# Patient Record
Sex: FEMALE | Race: BLACK OR AFRICAN AMERICAN | NOT HISPANIC OR LATINO
[De-identification: names, ages, dates, MRNs, and addresses within clinical notes are randomized per-mention and may not be internally consistent; named-entity substitution may affect disease eponyms.]

---

## 2023-03-24 PROBLEM — Z00.00 ENCOUNTER FOR PREVENTIVE HEALTH EXAMINATION: Status: ACTIVE | Noted: 2023-03-24

## 2023-05-30 ENCOUNTER — NON-APPOINTMENT (OUTPATIENT)
Age: 63
End: 2023-05-30

## 2023-05-30 ENCOUNTER — APPOINTMENT (OUTPATIENT)
Dept: ENDOCRINOLOGY | Facility: CLINIC | Age: 63
End: 2023-05-30
Payer: COMMERCIAL

## 2023-05-30 VITALS
SYSTOLIC BLOOD PRESSURE: 181 MMHG | HEIGHT: 67 IN | HEART RATE: 70 BPM | DIASTOLIC BLOOD PRESSURE: 106 MMHG | WEIGHT: 275 LBS | BODY MASS INDEX: 43.16 KG/M2

## 2023-05-30 PROCEDURE — 99205 OFFICE O/P NEW HI 60 MIN: CPT

## 2023-05-31 NOTE — REVIEW OF SYSTEMS
[Negative] : Heme/Lymph [Polyuria] : polyuria [Nocturia] : nocturia [As Noted in HPI] : as noted in HPI [Tremors] : tremors [Headaches] : no headaches

## 2023-05-31 NOTE — CONSULT LETTER
[Dear  ___] : Dear  [unfilled], [Consult Letter:] : I had the pleasure of evaluating your patient, [unfilled]. [Sincerely,] : Sincerely, [FreeTextEntry3] : Caesar Quintanilla\par

## 2023-05-31 NOTE — ADDENDUM
[FreeTextEntry1] : I, Erika Reed, acted solely as a scribe for Dr. Caesar Quintanilla on this date 05/30/2023

## 2023-05-31 NOTE — HISTORY OF PRESENT ILLNESS
[FreeTextEntry1] : 62 year old F pt, with Hx of pituitary lesion found incidentally in , referred by Dr. Dr. Gema Walsh, presents today to establish endocrine care. \par Other PMHx: HTN, Anxiety, Glaucoma, Bipolar/Schizophrenia \par No surgical history, No thyroid conditions, no liver disease, no bone fractures.\par Denies antipsychotic medication use in  - \par No COVID Infections\par FHx: Mother: Breast CA \par No FHx of: Thyroid Issues, pituitary issues \par SHx: Stopped smoking 2 yrs ago, Drinks wine occasionally\par Lifestyle: Wakes up 5:30 am, sleeps 9 pm. She drinks a lot of water:  \par PCP: Dr. Gema Walsh (Phone # [phone], Fax # [phone])  \par NKDA  \par Sees psychiatrist since . (Drug induced Psychotic break: PCP ~ 15 yrs ago) \par Two pregnancies that were two : age 24 and age 29. \par Flu and COVID Vaccines \par \par 2023 \par Pt is here for today for endocrine visit. No previous medical record seen. Referral indicates pt is here due to Pituitary lesion and Substance-induced psychotic disorder.  \par Pt states pituitary lesion was found incidentally in [facility] when she was in the psych amezquita due to a psychotic break in  from a " bad batch of gummies "  as per pt. Blood tests and an X-Ray of her brain was done. She notes an additional psychotics break on . During these episodes, she reports she was hearing things and was not functioning well. \par Endorses polyuria for ~ 5 yrs, which worsened 6 months ago, and nocturia: 2x after midnight. Prior, she had nocturia once every hr. Pt reports she was rx Oxybutynin by her Emergency Specialist, and says her polyuria has improved. She has seen an urologist, who said pt should continue to take Oxybutynin. In addition, pt endorses feeling off balance, occasional R hand tremors that occurs anytime since . \par Denies HAs.  \par \par Current Medications: Carvedilol, Amlodipine 5 mg, Olanzapine (Rx ~ ), Oxybutynin.

## 2023-05-31 NOTE — PHYSICAL EXAM
[Alert] : alert [Well Nourished] : well nourished [No Acute Distress] : no acute distress [Well Developed] : well developed [Normal Sclera/Conjunctiva] : normal sclera/conjunctiva [EOMI] : extra ocular movement intact [No Proptosis] : no proptosis [Normal Oropharynx] : the oropharynx was normal [Thyroid Not Enlarged] : the thyroid was not enlarged [No Thyroid Nodules] : no palpable thyroid nodules [No Respiratory Distress] : no respiratory distress [No Accessory Muscle Use] : no accessory muscle use [Clear to Auscultation] : lungs were clear to auscultation bilaterally [Normal S1, S2] : normal S1 and S2 [Normal Rate] : heart rate was normal [Regular Rhythm] : with a regular rhythm [No Edema] : no peripheral edema [Pedal Pulses Normal] : the pedal pulses are present [Normal Bowel Sounds] : normal bowel sounds [Not Tender] : non-tender [Not Distended] : not distended [Soft] : abdomen soft [Normal Anterior Cervical Nodes] : no anterior cervical lymphadenopathy [Normal Posterior Cervical Nodes] : no posterior cervical lymphadenopathy [No Spinal Tenderness] : no spinal tenderness [Spine Straight] : spine straight [No Stigmata of Cushings Syndrome] : no stigmata of Cushings Syndrome [Normal Gait] : normal gait [Normal Strength/Tone] : muscle strength and tone were normal [No Rash] : no rash [Normal Reflexes] : deep tendon reflexes were 2+ and symmetric [No Tremors] : no tremors [Oriented x3] : oriented to person, place, and time [Acanthosis Nigricans] : no acanthosis nigricans [de-identified] : No buffalo hump

## 2023-05-31 NOTE — END OF VISIT
[FreeTextEntry3] : All medical record entries made by the Scribe were at my, Dr. Caesar Quintanilla, direction and personally dictated by me on 05/30/2023. I have reviewed the chart and agree that the record accurately reflects my personal performance of the history, physical exam, assessment and plan. I have also personally, directed, reviewed and agreed with the chart  [Time Spent: ___ minutes] : I have spent [unfilled] minutes of time on the encounter.

## 2023-06-28 ENCOUNTER — NON-APPOINTMENT (OUTPATIENT)
Age: 63
End: 2023-06-28

## 2023-06-28 ENCOUNTER — APPOINTMENT (OUTPATIENT)
Dept: ENDOCRINOLOGY | Facility: CLINIC | Age: 63
End: 2023-06-28
Payer: COMMERCIAL

## 2023-06-28 VITALS
WEIGHT: 279 LBS | SYSTOLIC BLOOD PRESSURE: 194 MMHG | HEART RATE: 79 BPM | DIASTOLIC BLOOD PRESSURE: 113 MMHG | BODY MASS INDEX: 43.7 KG/M2

## 2023-06-28 PROCEDURE — 99214 OFFICE O/P EST MOD 30 MIN: CPT

## 2023-06-29 NOTE — END OF VISIT
[FreeTextEntry3] : All medical record entries made by the Scribe were at my, Dr. Caesar Quintanilla, direction and personally dictated by me on 06/28/2023. I have reviewed the chart and agree that the record accurately reflects my personal performance of the history, physical exam, assessment and plan. I have also personally directed, reviewed and agreed with the chart.  [Time Spent: ___ minutes] : I have spent [unfilled] minutes of time on the encounter.

## 2023-06-29 NOTE — PHYSICAL EXAM
[Alert] : alert [Well Nourished] : well nourished [No Acute Distress] : no acute distress [Well Developed] : well developed [Normal Sclera/Conjunctiva] : normal sclera/conjunctiva [EOMI] : extra ocular movement intact [No Proptosis] : no proptosis [Normal Oropharynx] : the oropharynx was normal [Thyroid Not Enlarged] : the thyroid was not enlarged [No Thyroid Nodules] : no palpable thyroid nodules [No Respiratory Distress] : no respiratory distress [No Accessory Muscle Use] : no accessory muscle use [Clear to Auscultation] : lungs were clear to auscultation bilaterally [Normal S1, S2] : normal S1 and S2 [Normal Rate] : heart rate was normal [Regular Rhythm] : with a regular rhythm [No Edema] : no peripheral edema [Pedal Pulses Normal] : the pedal pulses are present [Normal Bowel Sounds] : normal bowel sounds [Not Tender] : non-tender [Not Distended] : not distended [Soft] : abdomen soft [Normal Anterior Cervical Nodes] : no anterior cervical lymphadenopathy [No Spinal Tenderness] : no spinal tenderness [Spine Straight] : spine straight [No Stigmata of Cushings Syndrome] : no stigmata of Cushings Syndrome [Normal Gait] : normal gait [Normal Strength/Tone] : muscle strength and tone were normal [No Rash] : no rash [Normal Reflexes] : deep tendon reflexes were 2+ and symmetric [No Tremors] : no tremors [Oriented x3] : oriented to person, place, and time [Acanthosis Nigricans] : no acanthosis nigricans [de-identified] : No buffalo hump

## 2023-06-29 NOTE — HISTORY OF PRESENT ILLNESS
[FreeTextEntry1] : 62 year old F pt, with Hx of pituitary lesion found incidentally in , referred by Dr. Gema Walsh, presents today to establish endocrine care. \par Other PMHx: HTN, Anxiety, Glaucoma, Bipolar/Schizophrenia \par No surgical history, No thyroid conditions, no liver disease, no bone fractures.\par Denies antipsychotic medication use in  - \par No COVID Infections\par FHx: Mother: Breast CA \par No FHx of: Thyroid Issues, pituitary issues \par SHx: Stopped smoking 2 yrs ago, Drinks wine occasionally\par Lifestyle: Wakes up 5:30 am, sleeps 9 pm. She drinks a lot of water:  \par PCP: Dr. Gema Walsh (Phone # [phone], Fax # [phone])  \par NKDA  \par Sees psychiatrist since . (Drug induced Psychotic break: PCP ~ 15 yrs ago) \par Two pregnancies that were two : age 24 and age 29. \par Flu and COVID Vaccines \par \par 2023 \par Pt is here for today for endocrine visit. No previous medical record seen. Referral indicates pt is here due to Pituitary lesion and Substance-induced psychotic disorder.  \par Pt states pituitary lesion was found incidentally in [facility] when she was in the psych amezquita due to a psychotic break in  from a " bad batch of gummies "  as per pt. Blood tests and an X-Ray of her brain was done. She notes an additional psychotics break on . During these episodes, she reports she was hearing things and was not functioning well. \par Endorses polyuria for ~ 5 yrs, which worsened 6 months ago, and nocturia: 2x after midnight. Prior, she had nocturia once every hr. Pt reports she was rx Oxybutynin by her Emergency Specialist, and says her polyuria has improved. She has seen an urologist, who said pt should continue to take Oxybutynin. In addition, pt endorses feeling off balance, occasional R hand tremors that occurs anytime since . \par Denies HAs.  \par \par 2023\par CC: "I'm feeling fine"\par Physical complaints other than joints pain.\par She was prescribed oxybutynin however she continues with frequent urination, no fever, nor dysuria\par \par \par [Medications verified as per pt on 2023) \par Current Medications: Carvedilol, Amlodipine 5 mg, Olanzapine (Rx ~ ), Oxybutynin.

## 2023-06-29 NOTE — REVIEW OF SYSTEMS
[Polyuria] : polyuria [Nocturia] : nocturia [As Noted in HPI] : as noted in HPI [Tremors] : tremors [Negative] : Heme/Lymph [Headaches] : no headaches

## 2023-06-29 NOTE — ADDENDUM
[FreeTextEntry1] : I, Sun Bal act soley as a scribe for Dr. Caesar Quintanilla on this date. 06/28/2023

## 2023-06-29 NOTE — DATA REVIEWED
[FreeTextEntry1] : Scanned Labs:\par - 06/03/23: LDL-c 162, S.creat 0.81, cortisol 23.3, Free T4 1.1, TSH 1.35, Prolactin 20.8, ACTH 24 Respiratory

## 2023-07-06 ENCOUNTER — NON-APPOINTMENT (OUTPATIENT)
Age: 63
End: 2023-07-06

## 2023-10-12 ENCOUNTER — APPOINTMENT (OUTPATIENT)
Dept: NEUROLOGY | Facility: CLINIC | Age: 63
End: 2023-10-12
Payer: COMMERCIAL

## 2023-10-12 VITALS
HEART RATE: 66 BPM | WEIGHT: 269 LBS | BODY MASS INDEX: 42.22 KG/M2 | DIASTOLIC BLOOD PRESSURE: 102 MMHG | OXYGEN SATURATION: 99 % | HEIGHT: 67 IN | TEMPERATURE: 98.1 F | SYSTOLIC BLOOD PRESSURE: 157 MMHG

## 2023-10-12 VITALS — DIASTOLIC BLOOD PRESSURE: 94 MMHG | HEART RATE: 65 BPM | SYSTOLIC BLOOD PRESSURE: 134 MMHG | OXYGEN SATURATION: 98 %

## 2023-10-12 DIAGNOSIS — Z80.3 FAMILY HISTORY OF MALIGNANT NEOPLASM OF BREAST: ICD-10-CM

## 2023-10-12 DIAGNOSIS — Z83.3 FAMILY HISTORY OF DIABETES MELLITUS: ICD-10-CM

## 2023-10-12 PROCEDURE — 99203 OFFICE O/P NEW LOW 30 MIN: CPT

## 2023-10-13 PROBLEM — Z83.3 FAMILY HISTORY OF DIABETES MELLITUS: Status: ACTIVE | Noted: 2023-10-12

## 2023-10-13 PROBLEM — Z80.3 FAMILY HISTORY OF MALIGNANT NEOPLASM OF BREAST: Status: ACTIVE | Noted: 2023-10-12

## 2023-11-01 ENCOUNTER — RESULT REVIEW (OUTPATIENT)
Age: 63
End: 2023-11-01

## 2023-11-13 ENCOUNTER — OUTPATIENT (OUTPATIENT)
Dept: OUTPATIENT SERVICES | Facility: HOSPITAL | Age: 63
LOS: 1 days | End: 2023-11-13
Payer: COMMERCIAL

## 2023-11-13 ENCOUNTER — APPOINTMENT (OUTPATIENT)
Dept: MRI IMAGING | Facility: CLINIC | Age: 63
End: 2023-11-13

## 2023-11-13 ENCOUNTER — RESULT REVIEW (OUTPATIENT)
Age: 63
End: 2023-11-13

## 2023-11-13 PROCEDURE — 70553 MRI BRAIN STEM W/O & W/DYE: CPT | Mod: 26

## 2023-11-15 ENCOUNTER — APPOINTMENT (OUTPATIENT)
Dept: NEUROSURGERY | Facility: CLINIC | Age: 63
End: 2023-11-15
Payer: COMMERCIAL

## 2023-11-15 VITALS
RESPIRATION RATE: 18 BRPM | DIASTOLIC BLOOD PRESSURE: 97 MMHG | HEIGHT: 67 IN | SYSTOLIC BLOOD PRESSURE: 154 MMHG | BODY MASS INDEX: 42.22 KG/M2 | OXYGEN SATURATION: 98 % | WEIGHT: 269 LBS | HEART RATE: 71 BPM

## 2023-11-15 PROCEDURE — 99204 OFFICE O/P NEW MOD 45 MIN: CPT

## 2024-02-14 ENCOUNTER — NON-APPOINTMENT (OUTPATIENT)
Age: 64
End: 2024-02-14

## 2024-03-15 ENCOUNTER — OUTPATIENT (OUTPATIENT)
Dept: OUTPATIENT SERVICES | Facility: HOSPITAL | Age: 64
LOS: 1 days | End: 2024-03-15

## 2024-03-15 ENCOUNTER — APPOINTMENT (OUTPATIENT)
Dept: MRI IMAGING | Facility: CLINIC | Age: 64
End: 2024-03-15
Payer: COMMERCIAL

## 2024-03-15 PROCEDURE — 70553 MRI BRAIN STEM W/O & W/DYE: CPT | Mod: 26

## 2024-03-27 ENCOUNTER — APPOINTMENT (OUTPATIENT)
Dept: NEUROSURGERY | Facility: CLINIC | Age: 64
End: 2024-03-27
Payer: COMMERCIAL

## 2024-03-27 VITALS
DIASTOLIC BLOOD PRESSURE: 117 MMHG | OXYGEN SATURATION: 98 % | WEIGHT: 277 LBS | RESPIRATION RATE: 18 BRPM | HEART RATE: 58 BPM | SYSTOLIC BLOOD PRESSURE: 163 MMHG | HEIGHT: 67 IN | BODY MASS INDEX: 43.47 KG/M2

## 2024-03-27 PROCEDURE — 99215 OFFICE O/P EST HI 40 MIN: CPT

## 2024-03-27 NOTE — DATA REVIEWED
[de-identified] : EXAM: [id number] - MR SELLA ONLY WAW IC  - ORDERED BY: TODD BANK   PROCEDURE DATE:  03/15/2024    INTERPRETATION:  Clinical indication: Pituitary lesion.  MRI of the sella turcica was performed using coronal and sagittal T1-weighted sequence. Coronal T2-weighted sequence was performed as well. Axial T2 FLAIR and diffusion was performed through the brain. The patient was injected with approximately 12 cc of gadavist IV with 3 cc contrast discarded. Coronal and sagittal T1 was performed through the sella turcica. Dynamic coronal T1 was performed as well. Axial coronal sagittal T1 sequence was performed through the brain.  This exam is compared with prior MRI sella turcica performed on November 13, 2023.  Large lesion involving the sella/suprasellar region is again identified which does appear to abut the dorsal aspect of the superior clivus. The lesion measures approximately 1.9 x 2.2 x 2.1 cm and previously measured approximately 1.9 x 2.0 x 1.9 cm. This lesion again appears to abut the optic chiasm and the adjacent optic chiasm appears unremarkable  Both adjacent internal carotid arteries demonstrate normal flow voids.  Evaluation of brain parenchyma demonstrates no acute hemorrhage mass mass effect. No abnormal areas of enhancement is seen.  Evaluation of the diffusion weighted sequence demonstrates no abnormal areas of restricted diffusion to suggest acute infarct.  Impression: Slight increase in size of the previously noted sella/suprasellar lesion as described above.  --- End of Report ---

## 2024-03-27 NOTE — PHYSICAL EXAM
[General Appearance - Alert] : alert [General Appearance - In No Acute Distress] : in no acute distress [General Appearance - Well-Appearing] : healthy appearing [Oriented To Time, Place, And Person] : oriented to person, place, and time [Impaired Insight] : insight and judgment were intact [Affect] : the affect was normal [Memory Recent] : recent memory was not impaired [Sensation Tactile Decrease] : light touch was intact [Sclera] : the sclera and conjunctiva were normal [PERRL With Normal Accommodation] : pupils were equal in size, round, reactive to light, with normal accommodation [Extraocular Movements] : extraocular movements were intact [Neck Appearance] : the appearance of the neck was normal [] : no respiratory distress [Respiration, Rhythm And Depth] : normal respiratory rhythm and effort [Abnormal Walk] : normal gait [Skin Color & Pigmentation] : normal skin color and pigmentation [FreeTextEntry5] : CN II-XII grossly intact

## 2024-03-27 NOTE — HISTORY OF PRESENT ILLNESS
[FreeTextEntry1] : 62 y/o female with PMHx of HTN, anxiety, bipolar/schizophrenia, tremors, glaucoma, incidentally found to have pituitary mass while admitted at [facility] for psychosis Feb 2022.  Following pituitary lesion dx, patient has been following with endocrinologist Dr. Caesar Thakkar. Endo labs notable for cortisol 23.3.  Recently saw neurologist Dr. Hightower for evaluation of pituitary lesion who ordered new MRI for further evaluation which she completed 11/13/23 which showed 2 x 2 x 2 cm right pituitary mass with erosion of the dorsal clivus and extension into the suprasellar cistern.  11/15/23 pt presented for evaluation as referral from Dr. Hightower. Asymptomatic. Recommended she see neuro-opth Naif Delgado for visual field testing, continue f/u with endocrine, repeat MRI 3 months.   Returns TODAY for f/u and MRI review.  Denies vision changes, headaches, dizziness, other new/worsening focal neuro deficits.  Endorses has not followed up with endo.   Neurologist: Claudia Hightower Endocrinologist: Caesar Thakkar

## 2024-03-27 NOTE — ASSESSMENT
[FreeTextEntry1] : 63F with asymptomatic incidental pituitary adenoma. Labs WNL. Denies visual issues. No chiasmatic compression. Repeat MRI at 4 months shows increase in size. Given the growth of the tumor, I recommend the removal of the same. The surgery would be conducted with the help of an ENT doctor and would involve going through the nose. I have asked the patient to see a neuro-ophthalmologist for further evaluation, and am planning for surgical intervention on April 22nd, pending clearance. In the meantime, primary care and cardiac clearance will be needed due to the patient's elevated blood pressure.   After detailed imaging review and patient examination, Dr. D'Amico discussed surgical intervention with patient.   Potential surgical risks vs benefits and alternatives discussed with time allotted for questions and answers given. Patient verbalizes understanding and wishes to proceed with surgical intervention.  Date of surgery: 4/22/24  Pre-op surgical packet and medical clearance packets reviewed and given to patient. She will need medical and cardiac clearances.   Should see neuro-opth Dr. Naif Delgado for pre-op visual field testing.  Should also see ENT Dr. Foreman for pre-op eval.  F/u with endocrinologist Dr. Quintanilla.  Will need CT max/face the morning of surgery.   Patient verbalizes understanding of today's discussion and next steps in treatment plan.   Pre- op RAPT score 11/12. Predicted dispo: home.  CHG wipes and education given to patient by MA.    A total of 45 minutes was spent reviewing the labs, imaging and physical examination of the patient. We discussed the diagnosis, and the plan. The patient's questions were answered. The patient demonstrated an excellent understanding of the plan.

## 2024-04-05 ENCOUNTER — OUTPATIENT (OUTPATIENT)
Dept: OUTPATIENT SERVICES | Facility: HOSPITAL | Age: 64
LOS: 1 days | End: 2024-04-05
Payer: COMMERCIAL

## 2024-04-05 ENCOUNTER — APPOINTMENT (OUTPATIENT)
Dept: INTERNAL MEDICINE | Facility: CLINIC | Age: 64
End: 2024-04-05
Payer: COMMERCIAL

## 2024-04-05 ENCOUNTER — NON-APPOINTMENT (OUTPATIENT)
Age: 64
End: 2024-04-05

## 2024-04-05 VITALS
DIASTOLIC BLOOD PRESSURE: 102 MMHG | RESPIRATION RATE: 17 BRPM | TEMPERATURE: 97.2 F | BODY MASS INDEX: 43.47 KG/M2 | HEART RATE: 77 BPM | HEIGHT: 67 IN | SYSTOLIC BLOOD PRESSURE: 163 MMHG | WEIGHT: 277 LBS | OXYGEN SATURATION: 98 %

## 2024-04-05 DIAGNOSIS — E66.01 MORBID (SEVERE) OBESITY DUE TO EXCESS CALORIES: ICD-10-CM

## 2024-04-05 DIAGNOSIS — R73.03 PREDIABETES.: ICD-10-CM

## 2024-04-05 DIAGNOSIS — I10 ESSENTIAL (PRIMARY) HYPERTENSION: ICD-10-CM

## 2024-04-05 DIAGNOSIS — F25.0 SCHIZOAFFECTIVE DISORDER, BIPOLAR TYPE: ICD-10-CM

## 2024-04-05 DIAGNOSIS — Z01.818 ENCOUNTER FOR OTHER PREPROCEDURAL EXAMINATION: ICD-10-CM

## 2024-04-05 DIAGNOSIS — F06.1 SCHIZOAFFECTIVE DISORDER, BIPOLAR TYPE: ICD-10-CM

## 2024-04-05 PROCEDURE — 71046 X-RAY EXAM CHEST 2 VIEWS: CPT

## 2024-04-05 PROCEDURE — 93000 ELECTROCARDIOGRAM COMPLETE: CPT

## 2024-04-05 PROCEDURE — 36415 COLL VENOUS BLD VENIPUNCTURE: CPT

## 2024-04-05 PROCEDURE — 99204 OFFICE O/P NEW MOD 45 MIN: CPT

## 2024-04-05 PROCEDURE — 71046 X-RAY EXAM CHEST 2 VIEWS: CPT | Mod: 26

## 2024-04-05 PROCEDURE — 70486 CT MAXILLOFACIAL W/O DYE: CPT

## 2024-04-05 PROCEDURE — 70486 CT MAXILLOFACIAL W/O DYE: CPT | Mod: 26

## 2024-04-05 RX ORDER — OLANZAPINE 10 MG/1
10 TABLET, FILM COATED ORAL
Refills: 0 | Status: ACTIVE | COMMUNITY

## 2024-04-05 RX ORDER — ACETAMINOPHEN 500 MG
500 TABLET ORAL
Refills: 0 | Status: ACTIVE | COMMUNITY

## 2024-04-05 RX ORDER — OXYBUTYNIN CHLORIDE 15 MG/1
15 TABLET, EXTENDED RELEASE ORAL
Refills: 0 | Status: ACTIVE | COMMUNITY

## 2024-04-05 RX ORDER — AMLODIPINE BESYLATE 5 MG/1
5 TABLET ORAL
Refills: 0 | Status: ACTIVE | COMMUNITY

## 2024-04-05 RX ORDER — MULTIVITAMIN
TABLET ORAL
Refills: 0 | Status: ACTIVE | COMMUNITY

## 2024-04-05 RX ORDER — LATANOPROST/PF 0.005 %
0.01 DROPS OPHTHALMIC (EYE)
Refills: 0 | Status: ACTIVE | COMMUNITY

## 2024-04-05 RX ORDER — BENZTROPINE MESYLATE 1 MG/1
1 TABLET ORAL
Refills: 0 | Status: ACTIVE | COMMUNITY

## 2024-04-07 ENCOUNTER — TRANSCRIPTION ENCOUNTER (OUTPATIENT)
Age: 64
End: 2024-04-07

## 2024-04-08 LAB
ALBUMIN SERPL ELPH-MCNC: 4.5 G/DL
ALP BLD-CCNC: 93 U/L
ALT SERPL-CCNC: 25 U/L
ANION GAP SERPL CALC-SCNC: 12 MMOL/L
APTT BLD: 30.2 SEC
AST SERPL-CCNC: 20 U/L
BASOPHILS # BLD AUTO: 0.03 K/UL
BASOPHILS NFR BLD AUTO: 0.5 %
BILIRUB SERPL-MCNC: 0.4 MG/DL
BUN SERPL-MCNC: 13 MG/DL
CALCIUM SERPL-MCNC: 10.1 MG/DL
CHLORIDE SERPL-SCNC: 106 MMOL/L
CO2 SERPL-SCNC: 24 MMOL/L
CREAT SERPL-MCNC: 0.89 MG/DL
EGFR: 73 ML/MIN/1.73M2
EOSINOPHIL # BLD AUTO: 0.04 K/UL
EOSINOPHIL NFR BLD AUTO: 0.7 %
ESTIMATED AVERAGE GLUCOSE: 114 MG/DL
GLUCOSE SERPL-MCNC: 104 MG/DL
HBA1C MFR BLD HPLC: 5.6 %
HCG SERPL-MCNC: 2 MIU/ML
HCT VFR BLD CALC: 42.5 %
HGB BLD-MCNC: 14 G/DL
IMM GRANULOCYTES NFR BLD AUTO: 0.2 %
INR PPP: 1.06 RATIO
LYMPHOCYTES # BLD AUTO: 1.43 K/UL
LYMPHOCYTES NFR BLD AUTO: 23.8 %
MAN DIFF?: NORMAL
MCHC RBC-ENTMCNC: 29.9 PG
MCHC RBC-ENTMCNC: 32.9 GM/DL
MCV RBC AUTO: 90.6 FL
MONOCYTES # BLD AUTO: 0.43 K/UL
MONOCYTES NFR BLD AUTO: 7.1 %
NEUTROPHILS # BLD AUTO: 4.08 K/UL
NEUTROPHILS NFR BLD AUTO: 67.7 %
PLATELET # BLD AUTO: 288 K/UL
POTASSIUM SERPL-SCNC: 4.2 MMOL/L
PROT SERPL-MCNC: 7 G/DL
PT BLD: 12.1 SEC
RBC # BLD: 4.69 M/UL
RBC # FLD: 16.1 %
SODIUM SERPL-SCNC: 142 MMOL/L
WBC # FLD AUTO: 6.02 K/UL

## 2024-04-09 ENCOUNTER — APPOINTMENT (OUTPATIENT)
Dept: OTOLARYNGOLOGY | Facility: CLINIC | Age: 64
End: 2024-04-09
Payer: COMMERCIAL

## 2024-04-09 PROCEDURE — 99204 OFFICE O/P NEW MOD 45 MIN: CPT | Mod: 25

## 2024-04-09 PROCEDURE — 31231 NASAL ENDOSCOPY DX: CPT

## 2024-04-10 NOTE — HISTORY OF PRESENT ILLNESS
[de-identified] : CC: pituiatary lesion    HISTORY OF PRESENT ILLNESS: Aniyah Coleman is a 62 y/o female with  PMH of HTN, anxiety, bipolar/schizophrenia, tremors, glaucoma who presents today with pituitary mass that was incidentally found while admitted at [facility] for psychosis in Feb 2022 she was referred by neurosurgeon Dr. D'Amico. followed by endocrinologist Dr. Caesar Thakkar she has a good understanding of her diagnosis at baseline she does not have any nasal congestion, obstruction or rhinorrhea  REVIEW OF SYSTEMS: General ROS: negative for - chills, fatigue or fever Psychological ROS: negative for - anxiety or depression Ophthalmic ROS: negative for - blurry vision, decreased vision or double vision ENT ROS: negative except as noted from HPI Allergy and Immunology ROS: negative except as noted from HPI Hematological and Lymphatic ROS: negative for - bleeding problems Endocrine ROS: negative for - malaise/lethargy Respiratory ROS: negative for - stridor Cardiovascular ROS: negative for - chest pain Gastrointestinal ROS: negative for - appetite loss or nausea/vomiting Genitourinary ROS: negative for - incontinence Musculoskeletal ROS: negative for - gait disturbance Neurological ROS: negative for - behavioral changes Dermatological ROS: negative for - nail changes   Physical Exam:   GENERAL APPEARANCE: Well-developed and No Acute Distress. COMMUNICATION: Able to Communicate. Strong Voice.   HEAD AND FACE Eyes: Testing of ocular motility including primary gaze alignment normal. Inspection and Appearance: No evidence of lesions or masses Palpation: Palpation of the face reveals no sinus tenderness Salivary Glands: Symmetric without masses Facial Strength: Symmetric without evidence of facial paralysis   EAR, NOSE, MOUTH, and THROAT: Ear Canals and Tympanic Membranes, Bilateral: No evidence of inflammation or lesions. Thresholds: Clinical speech reception thresholds normal. External, Nose and Auricle: No lesions or masses. Nasal, Mucosa, Septum, and Turbinates: See endoscopy.   NECK: Evaluation: No evidence of masses or crepitus. The neck is symmetric and the trachea is in the midline position. Thyroid: No evidence of enlargement, tenderness or mass. Neck Lymph Nodes: WNL. Respiratory: Inspection of the chest including symmetry, expansion and/or assessment of respiratory effort normal. Cardiovascular: Evaluation of peripheral vascular system by observation and palpation of capillary refill, normal. Neurological/Psychiatric: Alert, Oriented, Mood, and Affect Normal.   PROCEDURE: Nasal endoscopy (35083)   SURGEON: Anthony oFreman MD   Prior to the procedure, I had a discussion with the patient regarding the risks, benefits, and alternatives of the procedure and a verbal consent was obtained.   After obtaining adequate decongestion of the nasal mucosa with topical Epinephrine and adequate anesthesia with topical Lidocaine nasal spray, the nasal endoscope was used to examine the nasal passages and paranasal sinuses. The endoscope was passed along the floor of the nose bilaterally to evaluate the inferior meatus, floor of the nose, inferior turbinate, and nasopharynx. The scope was then passed superiorly to evaluate the area of the sphenoethmoidal recess, superior turbinate and superior meatus. As the scope was withdrawn anteriorly, the middle turbinate and middle meatus were carefully inspected. The endoscope was withdrawn and the patient tolerated the procedure well. No complications were encountered.   INSTRUMENTS: rigid zero   EXAM FINDINGS: right septal deformity, no significant sinonasal inflammation   IMPRESSION: Ms. Coleman is a pleasant 63 year old lady with 2.2cm pituitary macroadenoma   PLAN: -r/b/a discussed, I explained my portion of the case with a transphenoidal endonasal approach and possible reconstruction. patient expressed understanding and wishes to proceed -plan for OR with Dr. D'Amico, navigation scan has been performed   Anthony Foreman MD [facility] Rhinology and Skull Base Surgery Department of Otolaryngology- Head and Neck Surgery [facility]

## 2024-04-12 ENCOUNTER — APPOINTMENT (OUTPATIENT)
Dept: HEART AND VASCULAR | Facility: CLINIC | Age: 64
End: 2024-04-12
Payer: COMMERCIAL

## 2024-04-12 VITALS
BODY MASS INDEX: 38.92 KG/M2 | OXYGEN SATURATION: 99 % | SYSTOLIC BLOOD PRESSURE: 171 MMHG | HEART RATE: 66 BPM | HEIGHT: 67 IN | WEIGHT: 248 LBS | DIASTOLIC BLOOD PRESSURE: 110 MMHG | TEMPERATURE: 98.1 F

## 2024-04-12 DIAGNOSIS — Z01.810 ENCOUNTER FOR PREPROCEDURAL CARDIOVASCULAR EXAMINATION: ICD-10-CM

## 2024-04-12 DIAGNOSIS — I10 ESSENTIAL (PRIMARY) HYPERTENSION: ICD-10-CM

## 2024-04-12 PROCEDURE — G2211 COMPLEX E/M VISIT ADD ON: CPT

## 2024-04-12 PROCEDURE — 99204 OFFICE O/P NEW MOD 45 MIN: CPT

## 2024-04-12 RX ORDER — CARVEDILOL 25 MG/1
25 TABLET, FILM COATED ORAL
Qty: 60 | Refills: 2 | Status: ACTIVE | COMMUNITY
Start: 1900-01-01 | End: 1900-01-01

## 2024-04-12 NOTE — ASSESSMENT
[FreeTextEntry1] : Risks and benefits regarding plan was discussed with the patient who voiced understanding and agreement to proceed.

## 2024-04-12 NOTE — DISCUSSION/SUMMARY
[Procedure High Risk] : the procedure risk is high [Patient Low Risk] : the patient is a low surgical risk [Optimized for Surgery] : the patient is optimized for surgery [As per surgery] : as per surgery [Continue] : Continue medications as currently directed [FreeTextEntry1] : HTN mgt as above

## 2024-04-12 NOTE — HISTORY OF PRESENT ILLNESS
[Preoperative Visit] : for a medical evaluation prior to surgery [Scheduled Procedure ___] : a [unfilled] [Date of Surgery ___] : on [unfilled] [Surgeon Name ___] : surgeon: [unfilled] [Fever] : no fever [Chills] : no chills [Fatigue] : no fatigue [Chest Pain] : no chest pain [Cough] : no cough [Dyspnea] : no dyspnea [Dysuria] : no dysuria [Urinary Frequency] : no urinary frequency [Nausea] : no nausea [Vomiting] : no vomiting [Diarrhea] : no diarrhea [Abdominal Pain] : no abdominal pain [Easy Bruising] : no easy bruising [Lower Extremity Swelling] : no lower extremity swelling [Diabetes] : no diabetes [Cardiovascular Disease] : no cardiovascular disease [Pulmonary Disease] : no pulmonary disease [Anti-Platelet Agents] : no anti-platelet agents [Nicotine Dependence] : no nicotine dependence [Alcohol Use] : alcohol use [Renal Disease] : no renal disease [GI Disease] : no gastrointestinal disease [Thromboembolic Problems] : no thromboembolic problems [Frequent use of NSAIDs] : no use of NSAIDs [Transfusion Reaction] : no transfusion reaction [Impaired Immunity] : no impaired immunity [Steroid Use in Last 6 Months] : no steroid use in the last six months [Frequent Aspirin Use] : no frequent aspirin use [Prior Anesthesia] : Prior anesthesia [Prev Anesthesia Reaction] : no previous anesthesia reaction [Electrocardiogram] : ~T an ECG ~C was performed [Metabolic Capacity ___Mets%] : The patient has a metabolic capacity of [unfilled] Mets%  [Good] : Good [Anesthesia Reaction] : no anesthesia reaction [Sudden Death] : no sudden death [Clotting Disorder] : no clotting disorder [Bleeding Disorder] : no bleeding disorder [FreeTextEntry1] : 63F w preDM, HTN, anxiety for preop CV eval prior to pituitary adenoma resection. - [place] subway to work daily, carries groceries -- no symptoms or unusual shortness of breath - loosing weight - denies chest pain but has rib pain that occurs when she stands up

## 2024-04-12 NOTE — PHYSICAL EXAM
[General Appearance - Well Developed] : well developed [Normal Appearance] : normal appearance [Well Groomed] : well groomed [General Appearance - Well Nourished] : well nourished [No Deformities] : no deformities [General Appearance - In No Acute Distress] : no acute distress [Normal Conjunctiva] : the conjunctiva exhibited no abnormalities [Eyelids - No Xanthelasma] : the eyelids demonstrated no xanthelasmas [Normal Oral Mucosa] : normal oral mucosa [No Oral Pallor] : no oral pallor [No Oral Cyanosis] : no oral cyanosis [Normal Jugular Venous A Waves Present] : normal jugular venous A waves present [Normal Jugular Venous V Waves Present] : normal jugular venous V waves present [No Jugular Venous Kitchen A Waves] : no jugular venous kitchen A waves [Respiration, Rhythm And Depth] : normal respiratory rhythm and effort [Exaggerated Use Of Accessory Muscles For Inspiration] : no accessory muscle use [Auscultation Breath Sounds / Voice Sounds] : lungs were clear to auscultation bilaterally [Heart Rate And Rhythm] : heart rate and rhythm were normal [Heart Sounds] : normal S1 and S2 [Murmurs] : no murmurs present [Abdomen Soft] : soft [Abdomen Tenderness] : non-tender [Abdomen Mass (___ Cm)] : no abdominal mass palpated [Abnormal Walk] : normal gait [Gait - Sufficient For Exercise Testing] : the gait was sufficient for exercise testing [Nail Clubbing] : no clubbing of the fingernails [Cyanosis, Localized] : no localized cyanosis [Petechial Hemorrhages (___cm)] : no petechial hemorrhages [Skin Color & Pigmentation] : normal skin color and pigmentation [] : no rash [No Venous Stasis] : no venous stasis [Skin Lesions] : no skin lesions [No Skin Ulcers] : no skin ulcer [No Xanthoma] : no  xanthoma was observed [Oriented To Time, Place, And Person] : oriented to person, place, and time [Affect] : the affect was normal [Mood] : the mood was normal [No Anxiety] : not feeling anxious

## 2024-04-13 ENCOUNTER — APPOINTMENT (OUTPATIENT)
Dept: CT IMAGING | Facility: HOSPITAL | Age: 64
End: 2024-04-13

## 2024-04-13 DIAGNOSIS — E23.7 DISORDER OF PITUITARY GLAND, UNSPECIFIED: ICD-10-CM

## 2024-04-13 DIAGNOSIS — I70.0 ATHEROSCLEROSIS OF AORTA: ICD-10-CM

## 2024-04-13 DIAGNOSIS — J32.0 CHRONIC MAXILLARY SINUSITIS: ICD-10-CM

## 2024-04-13 DIAGNOSIS — I51.7 CARDIOMEGALY: ICD-10-CM

## 2024-04-13 DIAGNOSIS — Z01.818 ENCOUNTER FOR OTHER PREPROCEDURAL EXAMINATION: ICD-10-CM

## 2024-04-15 ENCOUNTER — APPOINTMENT (OUTPATIENT)
Dept: OPHTHALMOLOGY | Facility: CLINIC | Age: 64
End: 2024-04-15
Payer: COMMERCIAL

## 2024-04-15 ENCOUNTER — NON-APPOINTMENT (OUTPATIENT)
Age: 64
End: 2024-04-15

## 2024-04-15 PROCEDURE — 92133 CPTRZD OPH DX IMG PST SGM ON: CPT

## 2024-04-15 PROCEDURE — 92083 EXTENDED VISUAL FIELD XM: CPT

## 2024-04-15 PROCEDURE — 92004 COMPRE OPH EXAM NEW PT 1/>: CPT

## 2024-04-15 RX ORDER — LOSARTAN POTASSIUM 25 MG/1
25 TABLET, FILM COATED ORAL
Qty: 30 | Refills: 5 | Status: ACTIVE | COMMUNITY
Start: 2024-04-15 | End: 1900-01-01

## 2024-04-15 NOTE — PHYSICAL EXAM
[No Acute Distress] : no acute distress [Well-Appearing] : well-appearing [Normal Sclera/Conjunctiva] : normal sclera/conjunctiva [Normal Outer Ear/Nose] : the outer ears and nose were normal in appearance [Normal] : normal rate, regular rhythm, normal S1 and S2 and no murmur heard [No Edema] : there was no peripheral edema [Soft] : abdomen soft [Non Tender] : non-tender [Speech Grossly Normal] : speech grossly normal [Normal Mood] : the mood was normal [de-identified] : +lip smacking/tremor

## 2024-04-15 NOTE — HISTORY OF PRESENT ILLNESS
[No Pertinent Cardiac History] : no history of aortic stenosis, atrial fibrillation, coronary artery disease, recent myocardial infarction, or implantable device/pacemaker [No Pertinent Pulmonary History] : no history of asthma, COPD, sleep apnea, or smoking [(Patient denies any chest pain, claudication, dyspnea on exertion, orthopnea, palpitations or syncope)] : Patient denies any chest pain, claudication, dyspnea on exertion, orthopnea, palpitations or syncope [Moderate (4-6 METs)] : Moderate (4-6 METs) [Self] : no previous adverse anesthesia reaction [Chronic Anticoagulation] : no chronic anticoagulation [Chronic Kidney Disease] : no chronic kidney disease [Diabetes] : no diabetes [FreeTextEntry1] : pituitary adenoma resection [FreeTextEntry2] : 4/22/24 [FreeTextEntry3] : Dr. D'Amico [FreeTextEntry4] : Ms. PERLA MAC is a 63-year-old female with history of class III obesity, prediabetes, HTN, Anxiety, bipolar/schizophrenia c/b tremor on Cogentin, glaucoma and pituitary mass presenting today to the [facility] prior to resection of pituitary adenoma. She endorses concern about b/l lower rib pain for the past month that occurs on standing up from her desk.  [FreeTextEntry8] : [place] the subway [place] going up flights of stairs without issue

## 2024-04-15 NOTE — ADDENDUM
[FreeTextEntry1] : Labs reviewed and no significant abnormalities. Pending cardiology consult for uncontrolled HTN.   Cardiology consultation reviewed. She is considered low cardiac risk and antihypertensive regiment adjusted. Patient optimized for surgery.

## 2024-04-15 NOTE — ASSESSMENT
[High Risk Surgery - Intraperitoneal, Intrathoracic or Supringuinal Vascular Procedures] : High Risk Surgery - Intraperitoneal, Intrathoracic or Supringuinal Vascular Procedures - No (0) [Ischemic Heart Disease] : Ischemic Heart Disease - No (0) [Congestive Heart Failure] : Congestive Heart Failure - No (0) [Prior Cerebrovascular Accident or TIA] : Prior Cerebrovascular Accident or TIA - No (0) [Creatinine >= 2mg/dL (1 Point)] : Creatinine >= 2mg/dL - No (0) [Insulin-dependent Diabetic (1 Point)] : Insulin-dependent Diabetic - No (0) [0] : 0 , RCRI Class: I, Risk of Post-Op Cardiac Complications: 3.9%, 95% CI for Risk Estimate: 2.8% - 5.4% [Patient Optimized for Surgery] : Patient optimized for surgery [FreeTextEntry4] : Ms. PERLA MAC is a 63-year-old female with history of class III obesity, prediabetes, HTN, Anxiety, bipolar/schizophrenia c/b tremor on Cogentin, glaucoma and pituitary mass presenting today to the [facility] prior to resection of pituitary adenoma. labs drawn in office today. Will need cardiology evaluation prior to surgery. Will addend note pending labs and cardiology consultation.

## 2024-04-19 VITALS
DIASTOLIC BLOOD PRESSURE: 97 MMHG | WEIGHT: 247.36 LBS | RESPIRATION RATE: 18 BRPM | TEMPERATURE: 98 F | OXYGEN SATURATION: 97 % | HEIGHT: 67 IN | SYSTOLIC BLOOD PRESSURE: 159 MMHG | HEART RATE: 60 BPM

## 2024-04-19 RX ORDER — OXYBUTYNIN CHLORIDE 5 MG
1 TABLET ORAL
Refills: 0 | DISCHARGE

## 2024-04-19 NOTE — PATIENT PROFILE ADULT - FUNCTIONAL ASSESSMENT - BASIC MOBILITY SCORE.
Aortic stenosis    Cirrhosis of liver    Elevated TSH    HLD (hyperlipidemia)    HTN (hypertension)    Hyperparathyroidism    NAFLD (nonalcoholic fatty liver disease)    Non-alcoholic fatty liver disease    Portal hypertension    Renal insufficiency    Thrombocytopenia    Vitamin D deficiency    White coat syndrome without diagnosis of hypertension 24

## 2024-04-19 NOTE — PRE-OP CHECKLIST - CHLOROHEXIDINE WASH IN ASU
Other Topics Concern    Not on file   Social History Narrative    Not on file       Medications:   No current outpatient medications on file. No current facility-administered medications for this visit. Social History:   Social History     Socioeconomic History    Marital status: Single     Spouse name: Not on file    Number of children: Not on file    Years of education: Not on file    Highest education level: Not on file   Occupational History    Not on file   Social Needs    Financial resource strain: Not on file    Food insecurity     Worry: Not on file     Inability: Not on file    Transportation needs     Medical: Not on file     Non-medical: Not on file   Tobacco Use    Smoking status: Not on file   Substance and Sexual Activity    Alcohol use: Not on file    Drug use: Not on file    Sexual activity: Not on file   Lifestyle    Physical activity     Days per week: Not on file     Minutes per session: Not on file    Stress: Not on file   Relationships    Social connections     Talks on phone: Not on file     Gets together: Not on file     Attends Evangelical service: Not on file     Active member of club or organization: Not on file     Attends meetings of clubs or organizations: Not on file     Relationship status: Not on file    Intimate partner violence     Fear of current or ex partner: Not on file     Emotionally abused: Not on file     Physically abused: Not on file     Forced sexual activity: Not on file   Other Topics Concern    Not on file   Social History Narrative    Not on file       TOBACCO:   has no history on file for tobacco.  ETOH:   has no history on file for alcohol. Family History:   No family history on file. Diagnosis:    Generalized anxiety disorder  No past medical history on file. Problems related to the social environment    Plan:  1. Continue medication management  2. CBT to target cognitive distortions  3.  Discuss therapeutic goals    Pt
22-Apr-2024 10:30

## 2024-04-19 NOTE — PATIENT PROFILE ADULT - FALL HARM RISK - HARM RISK INTERVENTIONS

## 2024-04-19 NOTE — PRE-OP CHECKLIST - SELECT TESTS ORDERED
CBC/CMP/PT/PTT/INR/EKG/CXR BS -/CBC/CMP/PT/PTT/INR/EKG/CXR/POCT Blood Glucose BS - 89/CBC/CMP/PT/PTT/INR/EKG/CXR/POCT Blood Glucose

## 2024-04-19 NOTE — PATIENT PROFILE ADULT - SURGICAL SITE INCISION
[facility]            [address]          [facility], [address] [facility]          Dept: [phone]          Dept Fax: [phone]    MD Stas Branham MD Richardine Keels, MD Martina Bunker, CNP            7/7/2022      HISTORY OF PRESENT ILLNESS:       I had the pleasure of seeing Eyad Loving, who returns for continuing neurologic care. The patient was seen last on January 6, 2022 for treatment of right knee dysesthesias, depression, airway resistance syndrome and midthoracic back pain. The patient has right knee dysesthesias since an injury in 2013 and follows with pain management. She also has complex regional pain syndrome which was improved with a spinal cord stimulator. She also has fibromyalgia and follows with rheumatology. She recently started seeing Dr. Brenda Cervantes from pain management at [facility] clinic due to an insurance change    For management of her depression she was prescribed effexor 37.5 mg daily however she contacted the office in May 2022 and it was increased to 75 mg daily. She notes that the increased dosage of effexor has been effective in managing her depression. She also has airway resistance syndrome and is in the process of obtaining nasal CPAP. She also has midthoracic back pain due to disc herniations and cord flattening of the thoracic cord. For management she was prescribed flexeril 10 mg three times daily and follows with pain management and orthopedic surgery. She was recently evaluated by Dr. Indra Hensley who reported that her disc herniations have remained stable. She notes that she was recently recommended to undergo a second spinal cord stimulator implantation. She currently has a trial of a spinal cord stimulator implanted which has been providing her with relief.          Testing reviewed:    MRI Thoracic Spine WO Contrast 6/21/2021  Impression:  Overall the MR of the thoracic spine is stable since 8/14/2020. The sizable T9-T10 disc herniation to the left of midline with impingement upon the left anterolateral thecal sac and flattening of the cord is stable. Other smaller disc bulges/protrusions are stable also.      MRI Thoracic spine 08/14/2020  Impression: 1. Large central slightly greater to left of midline disc herniation at T9-10 with cord flattening                      2. Small right paracentral disc protrusions at T7-8 and T9-10     -EMG/NCV study on January 16, 2019 which was normal.  -MRI of the lumbar spine showing an essentially normal exam for stated age. Hulan Phoenix bulging L4-L5 disc without contact with neuro structures.  No herniated discs, central canal or foraminal stenosis.  Mild lower lumbar facet joint arthrosis including a small for jet joint cyst as reported,6/18/15      PAST MEDICAL HISTORY:         Diagnosis Date    Arthritis     rheumatology   Dr. Juanita Phillips Chronic pain     Complex regional pain syndrome i of right lower limb 10/02/2019    pain mgmt   Dr. Tasneem Vaughn. Has pain stimulator.     CRPS (complex regional pain syndrome type I)     Depression 06/27/2019    counseling    Fibromyalgia     Hyperlipidemia     pcp manages    Snores     airway resistance- getting a cpap    Tachycardia     Dr. Jeremiah Penn last seen 2021    Wellness examination     Britni Ansari last seen 1/2022        PAST SURGICAL HISTORY:         Procedure Laterality Date    CYSTOSCOPY N/A 1/14/2022    CYSTOSCOPY performed by Marvin Garcia MD at [address] [place] [address] ARTHROSCOPY      rt knee 3/2014    LIPOMA RESECTION N/A 05/10/2022     POSTERIOR NECK LIPOMA BIOPSY EXCISION (N/A Neck)    NECK SURGERY N/A 5/10/2022    EXCISION OF POSTERIOR NECK LIPOMA performed by Mami George DO at [address] [place] [address]  03/2021    WISDOM TOOTH EXTRACTION          SOCIAL HISTORY:     Social History     Socioeconomic History    Marital status:  MEDICATIONS:     Current Outpatient Medications   Medication Sig Dispense Refill    cyclobenzaprine (FLEXERIL) 10 MG tablet Take 1 tablet by mouth 3 times daily 270 tablet 1    venlafaxine (EFFEXOR XR) 75 MG extended release capsule Take 1 capsule by mouth daily 90 capsule 2    oxyCODONE-acetaminophen (PERCOCET) 5-325 MG per tablet Take 1 tablet by mouth every 4 hours as needed for Pain.  celecoxib (CELEBREX) 200 MG capsule TAKE 1 CAPSULE DAILY 90 capsule 3    simvastatin (ZOCOR) 80 MG tablet TAKE 1 TABLET NIGHTLY (Patient taking differently: Take 40 mg by mouth 1/2 tab) 90 tablet 3    doxyLAMINE succinate (GNP SLEEP AID) 25 MG tablet Take 25 mg by mouth       No current facility-administered medications for this visit. ALLERGIES:     Allergies   Allergen Reactions    Trazodone And Nefazodone Anaphylaxis    Nucynta [Tapentadol] Nausea And Vomiting                                 REVIEW OF SYSTEMS        All items selected indicate a positive finding. Those items not selected are negative.   Constitutional [] Weight loss/gain   [] Fatigue  [] Fever/Chills   HEENT [] Hearing Loss  [] Visual Disturbance  [] Tinnitus  [] Eye pain   Respiratory [] Shortness of Breath  [] Cough  [] Snoring   Cardiovascular [] Chest Pain  [] Palpitations  [] Lightheaded   GI [] Constipation  [] Diarrhea  [] Swallowing change  [] Nausea/vomiting    [] Urinary Frequency  [] Urinary Urgency   Musculoskeletal [] Neck pain  [x] Back pain  [] Muscle pain  [] Restless legs   Dermatologic [] Skin changes   Neurologic [] Memory loss/confusion  [] Seizures  [] Trouble walking or imbalance  [] Dizziness  [] Sleep disturbance  [] Weakness  [] Numbness  [] Tremors  [] Speech Difficulty  [] Headaches  [] Light Sensitivity  [] Sound Sensitivity   Endocrinology []Excessive thirst  []Excessive hunger   Psychiatric [x] Anxiety/Depression  [] Hallucination   Allergy/immunology []Hives/environmental allergies   Hematologic/lymph [] fasciculation      Motor function  Normal muscle bulk and tone; strength 5/5 on all 4 extremities, no pronator drift      Sensory function Intact to light touch, pinprick, vibration, proprioception on all 4 extremities      Cerebellar Intact fine motor movement. No involuntary movements or tremors. No ataxia or dysmetria on finger to nose or heel to shin testing      Reflex function DTR 2+ on bilateral UE and LE, symmetric. Down going toes bilaterally      Gait                   normal base and arm swing                  Medical Decision Making: In summary, your patient, Alexander Olivera exhibits the following, with associated plan:      1. Dysesthesias of the right knee which is chronic since an injury in 2013.  She was previously diagnosed with complex regional pain syndrome which is improved with the use of a spinal cord stimulator. The patient was also recently seen by rheumatology and was diagnosed with fibromyalgia. She also recently had a positive VIRAL which upon further examination was found to likely be a false positive  1. Continue to follow with pain management  2. Continue to follow with rheumatology  3. Return in follow-up in 6 months  2. Depression  1. Continue Effexor 75 mg daily   3. Airway resistance syndrome recently diagnosed by baseline diagnostic sleep study  1. Patient is in the process of obtaining nasal CPAP  4. Midthoracic back pain, due to disc herniations and cord flattening of the thoracic cord. The patient does not display any signs of myelopathy. The patient displays no hyperactivity of her reflexes or any other upper motor neuron symptoms  1. Increase Flexeril to 10 mg three times daily, rather than as needed  2. Continue to follow with pain management  3. Continue to follow with Dr. Mary Clement, orthopedic surgery, who reported that surgical intervention is not warranted until she begins to display more neurologic symptoms  4.  She is currently has a second spinal cord stimulator implanted as a trial which has been effective in managing her back pain. She will likely have the second spinal cord stimulator implanted in August 2022              Signed: Per Vang CNP      *Please note that portions of this note were completed with a voice recognition program.  Although every effort was made to insure the accuracy of this automated transcription, some errors in transcription may have occurred, occasionally words and are mis-transcribed    Provider Attestation: The documentation recorded by the scribe accurately reflects the service I personally performed and the decisions made by myself. Portions of this note were transcribed by a scribe. I personally performed the history, physical exam, and medical decision-making and confirm the accuracy of the information in the transcribed note. Scribe Attestation:   By signing my name below, Iván Ramirez, attest that this documentation has been prepared under the direction and in the presence of Per Vang CNP. no

## 2024-04-22 ENCOUNTER — INPATIENT (INPATIENT)
Facility: HOSPITAL | Age: 64
LOS: 2 days | Discharge: HOME HEALTH | End: 2024-04-25
Attending: NEUROLOGICAL SURGERY | Admitting: GENERAL ACUTE CARE HOSPITAL
Payer: COMMERCIAL

## 2024-04-22 ENCOUNTER — RESULT REVIEW (OUTPATIENT)
Age: 64
End: 2024-04-22

## 2024-04-22 ENCOUNTER — APPOINTMENT (OUTPATIENT)
Dept: NEUROSURGERY | Facility: HOSPITAL | Age: 64
End: 2024-04-22

## 2024-04-22 ENCOUNTER — APPOINTMENT (OUTPATIENT)
Dept: OTOLARYNGOLOGY | Facility: HOSPITAL | Age: 64
End: 2024-04-22

## 2024-04-22 DIAGNOSIS — Z98.891 HISTORY OF UTERINE SCAR FROM PREVIOUS SURGERY: Chronic | ICD-10-CM

## 2024-04-22 DIAGNOSIS — D3A.8 OTHER BENIGN NEUROENDOCRINE TUMORS: ICD-10-CM

## 2024-04-22 PROBLEM — I10 ESSENTIAL (PRIMARY) HYPERTENSION: Chronic | Status: ACTIVE | Noted: 2024-04-19

## 2024-04-22 PROBLEM — H40.9 UNSPECIFIED GLAUCOMA: Chronic | Status: ACTIVE | Noted: 2024-04-19

## 2024-04-22 LAB
ALBUMIN SERPL ELPH-MCNC: 4 G/DL — SIGNIFICANT CHANGE UP (ref 3.3–5)
ALP SERPL-CCNC: 95 U/L — SIGNIFICANT CHANGE UP (ref 40–120)
ALT FLD-CCNC: 26 U/L — SIGNIFICANT CHANGE UP (ref 10–45)
ANION GAP SERPL CALC-SCNC: 11 MMOL/L — SIGNIFICANT CHANGE UP (ref 5–17)
APTT BLD: 25 SEC — SIGNIFICANT CHANGE UP (ref 24.5–35.6)
AST SERPL-CCNC: 18 U/L — SIGNIFICANT CHANGE UP (ref 10–40)
BASE EXCESS BLDA CALC-SCNC: -0.4 MMOL/L — SIGNIFICANT CHANGE UP (ref -2–3)
BILIRUB SERPL-MCNC: 0.2 MG/DL — SIGNIFICANT CHANGE UP (ref 0.2–1.2)
BLD GP AB SCN SERPL QL: NEGATIVE — SIGNIFICANT CHANGE UP
BUN SERPL-MCNC: 10 MG/DL — SIGNIFICANT CHANGE UP (ref 7–23)
CA-I BLDA-SCNC: 1.3 MMOL/L — SIGNIFICANT CHANGE UP (ref 1.15–1.33)
CALCIUM SERPL-MCNC: 9.9 MG/DL — SIGNIFICANT CHANGE UP (ref 8.4–10.5)
CHLORIDE SERPL-SCNC: 105 MMOL/L — SIGNIFICANT CHANGE UP (ref 96–108)
CO2 BLDA-SCNC: 26 MMOL/L — HIGH (ref 19–24)
CO2 SERPL-SCNC: 24 MMOL/L — SIGNIFICANT CHANGE UP (ref 22–31)
COHGB MFR BLDA: 1.2 % — SIGNIFICANT CHANGE UP
CREAT SERPL-MCNC: 0.68 MG/DL — SIGNIFICANT CHANGE UP (ref 0.5–1.3)
EGFR: 98 ML/MIN/1.73M2 — SIGNIFICANT CHANGE UP
GLUCOSE BLDA-MCNC: 114 MG/DL — HIGH (ref 70–99)
GLUCOSE BLDC GLUCOMTR-MCNC: 89 MG/DL — SIGNIFICANT CHANGE UP (ref 70–99)
GLUCOSE SERPL-MCNC: 122 MG/DL — HIGH (ref 70–99)
HCO3 BLDA-SCNC: 25 MMOL/L — SIGNIFICANT CHANGE UP (ref 21–28)
HCT VFR BLD CALC: 39.8 % — SIGNIFICANT CHANGE UP (ref 34.5–45)
HGB BLD-MCNC: 13.1 G/DL — SIGNIFICANT CHANGE UP (ref 11.5–15.5)
HGB BLDA-MCNC: 13.6 G/DL — SIGNIFICANT CHANGE UP (ref 11.7–16.1)
INR BLD: 1.16 — SIGNIFICANT CHANGE UP (ref 0.85–1.18)
MAGNESIUM SERPL-MCNC: 1.9 MG/DL — SIGNIFICANT CHANGE UP (ref 1.6–2.6)
MCHC RBC-ENTMCNC: 29.8 PG — SIGNIFICANT CHANGE UP (ref 27–34)
MCHC RBC-ENTMCNC: 32.9 GM/DL — SIGNIFICANT CHANGE UP (ref 32–36)
MCV RBC AUTO: 90.7 FL — SIGNIFICANT CHANGE UP (ref 80–100)
METHGB MFR BLDA: 0.7 % — SIGNIFICANT CHANGE UP
NRBC # BLD: 0 /100 WBCS — SIGNIFICANT CHANGE UP (ref 0–0)
OXYHGB MFR BLDA: 98.1 % — HIGH (ref 90–95)
PCO2 BLDA: 42 MMHG — SIGNIFICANT CHANGE UP (ref 32–45)
PH BLDA: 7.38 — SIGNIFICANT CHANGE UP (ref 7.35–7.45)
PHOSPHATE SERPL-MCNC: 3.2 MG/DL — SIGNIFICANT CHANGE UP (ref 2.5–4.5)
PLATELET # BLD AUTO: 240 K/UL — SIGNIFICANT CHANGE UP (ref 150–400)
PO2 BLDA: 239 MMHG — HIGH (ref 83–108)
POTASSIUM BLDA-SCNC: 4.1 MMOL/L — SIGNIFICANT CHANGE UP (ref 3.5–5.1)
POTASSIUM SERPL-MCNC: 4.4 MMOL/L — SIGNIFICANT CHANGE UP (ref 3.5–5.3)
POTASSIUM SERPL-SCNC: 4.4 MMOL/L — SIGNIFICANT CHANGE UP (ref 3.5–5.3)
PROT SERPL-MCNC: 6.4 G/DL — SIGNIFICANT CHANGE UP (ref 6–8.3)
PROTHROM AB SERPL-ACNC: 13.2 SEC — HIGH (ref 9.5–13)
RBC # BLD: 4.39 M/UL — SIGNIFICANT CHANGE UP (ref 3.8–5.2)
RBC # FLD: 14.8 % — HIGH (ref 10.3–14.5)
RH IG SCN BLD-IMP: POSITIVE — SIGNIFICANT CHANGE UP
SAO2 % BLDA: 100 % — HIGH (ref 94–98)
SODIUM BLDA-SCNC: 138 MMOL/L — SIGNIFICANT CHANGE UP (ref 136–145)
SODIUM SERPL-SCNC: 140 MMOL/L — SIGNIFICANT CHANGE UP (ref 135–145)
WBC # BLD: 6.62 K/UL — SIGNIFICANT CHANGE UP (ref 3.8–10.5)
WBC # FLD AUTO: 6.62 K/UL — SIGNIFICANT CHANGE UP (ref 3.8–10.5)

## 2024-04-22 PROCEDURE — 88334 PATH CONSLTJ SURG CYTO XM EA: CPT | Mod: 26,59

## 2024-04-22 PROCEDURE — 88360 TUMOR IMMUNOHISTOCHEM/MANUAL: CPT | Mod: 26,1L,59

## 2024-04-22 PROCEDURE — 15740 ISLAND PEDICLE FLAP GRAFT: CPT

## 2024-04-22 PROCEDURE — 62165 REMOVE PITUIT TUMOR W/SCOPE: CPT | Mod: 62

## 2024-04-22 PROCEDURE — 88331 PATH CONSLTJ SURG 1 BLK 1SPC: CPT | Mod: 26

## 2024-04-22 PROCEDURE — 88342 IMHCHEM/IMCYTCHM 1ST ANTB: CPT | Mod: 26

## 2024-04-22 PROCEDURE — 61781 SCAN PROC CRANIAL INTRA: CPT

## 2024-04-22 PROCEDURE — 88341 IMHCHEM/IMCYTCHM EA ADD ANTB: CPT | Mod: 26

## 2024-04-22 PROCEDURE — 99291 CRITICAL CARE FIRST HOUR: CPT

## 2024-04-22 PROCEDURE — 61782 SCAN PROC CRANIAL EXTRA: CPT

## 2024-04-22 PROCEDURE — 88305 TISSUE EXAM BY PATHOLOGIST: CPT | Mod: 26

## 2024-04-22 DEVICE — SURGIFLO HEMOSTATIC MATRIX KIT: Type: IMPLANTABLE DEVICE | Status: FUNCTIONAL

## 2024-04-22 DEVICE — DVC ADHERUS AUTOSPRAY W/ DURAL SEALANT EXT TIP: Type: IMPLANTABLE DEVICE | Status: FUNCTIONAL

## 2024-04-22 DEVICE — MATRIX DURAGEN PLUS DURAL REGENERATION 3X3: Type: IMPLANTABLE DEVICE | Status: FUNCTIONAL

## 2024-04-22 DEVICE — SURGIFOAM PAD 8CM X 12.5CM X 10MM (100): Type: IMPLANTABLE DEVICE | Status: FUNCTIONAL

## 2024-04-22 DEVICE — SURGCEL 4 X 8": Type: IMPLANTABLE DEVICE | Status: FUNCTIONAL

## 2024-04-22 RX ORDER — LOSARTAN POTASSIUM 100 MG/1
1 TABLET, FILM COATED ORAL
Refills: 0 | DISCHARGE

## 2024-04-22 RX ORDER — POVIDONE-IODINE 5 %
1 AEROSOL (ML) TOPICAL ONCE
Refills: 0 | Status: COMPLETED | OUTPATIENT
Start: 2024-04-22 | End: 2024-04-22

## 2024-04-22 RX ORDER — BENZTROPINE MESYLATE 1 MG
1 TABLET ORAL DAILY
Refills: 0 | Status: DISCONTINUED | OUTPATIENT
Start: 2024-04-22 | End: 2024-04-25

## 2024-04-22 RX ORDER — POLYETHYLENE GLYCOL 3350 17 G/17G
17 POWDER, FOR SOLUTION ORAL DAILY
Refills: 0 | Status: DISCONTINUED | OUTPATIENT
Start: 2024-04-22 | End: 2024-04-24

## 2024-04-22 RX ORDER — SODIUM CHLORIDE 9 MG/ML
1000 INJECTION INTRAMUSCULAR; INTRAVENOUS; SUBCUTANEOUS
Refills: 0 | Status: DISCONTINUED | OUTPATIENT
Start: 2024-04-22 | End: 2024-04-22

## 2024-04-22 RX ORDER — NICARDIPINE HYDROCHLORIDE 30 MG/1
5 CAPSULE, EXTENDED RELEASE ORAL
Qty: 40 | Refills: 0 | Status: DISCONTINUED | OUTPATIENT
Start: 2024-04-22 | End: 2024-04-23

## 2024-04-22 RX ORDER — OXYCODONE HYDROCHLORIDE 5 MG/1
10 TABLET ORAL EVERY 4 HOURS
Refills: 0 | Status: DISCONTINUED | OUTPATIENT
Start: 2024-04-22 | End: 2024-04-25

## 2024-04-22 RX ORDER — CARVEDILOL PHOSPHATE 80 MG/1
25 CAPSULE, EXTENDED RELEASE ORAL EVERY 12 HOURS
Refills: 0 | Status: DISCONTINUED | OUTPATIENT
Start: 2024-04-22 | End: 2024-04-25

## 2024-04-22 RX ORDER — CEFTRIAXONE 500 MG/1
1000 INJECTION, POWDER, FOR SOLUTION INTRAMUSCULAR; INTRAVENOUS EVERY 12 HOURS
Refills: 0 | Status: DISCONTINUED | OUTPATIENT
Start: 2024-04-22 | End: 2024-04-22

## 2024-04-22 RX ORDER — AMLODIPINE BESYLATE 2.5 MG/1
5 TABLET ORAL DAILY
Refills: 0 | Status: DISCONTINUED | OUTPATIENT
Start: 2024-04-22 | End: 2024-04-25

## 2024-04-22 RX ORDER — SODIUM CHLORIDE 9 MG/ML
1000 INJECTION, SOLUTION INTRAVENOUS ONCE
Refills: 0 | Status: DISCONTINUED | OUTPATIENT
Start: 2024-04-22 | End: 2024-04-22

## 2024-04-22 RX ORDER — BENZTROPINE MESYLATE 1 MG
1 TABLET ORAL
Refills: 0 | DISCHARGE

## 2024-04-22 RX ORDER — RACEPINEPHRINE HCL 2.25 %
1 SOLUTION, NON-ORAL TOPICAL ONCE
Refills: 0 | Status: DISCONTINUED | OUTPATIENT
Start: 2024-04-22 | End: 2024-04-22

## 2024-04-22 RX ORDER — OLANZAPINE 15 MG/1
10 TABLET, FILM COATED ORAL DAILY
Refills: 0 | Status: DISCONTINUED | OUTPATIENT
Start: 2024-04-22 | End: 2024-04-25

## 2024-04-22 RX ORDER — ACETAMINOPHEN 500 MG
1000 TABLET ORAL EVERY 8 HOURS
Refills: 0 | Status: COMPLETED | OUTPATIENT
Start: 2024-04-22 | End: 2024-04-24

## 2024-04-22 RX ORDER — LATANOPROST 0.05 MG/ML
1 SOLUTION/ DROPS OPHTHALMIC; TOPICAL
Refills: 0 | DISCHARGE

## 2024-04-22 RX ORDER — APREPITANT 80 MG/1
40 CAPSULE ORAL ONCE
Refills: 0 | Status: COMPLETED | OUTPATIENT
Start: 2024-04-22 | End: 2024-04-22

## 2024-04-22 RX ORDER — OXYCODONE HYDROCHLORIDE 5 MG/1
5 TABLET ORAL EVERY 4 HOURS
Refills: 0 | Status: DISCONTINUED | OUTPATIENT
Start: 2024-04-22 | End: 2024-04-25

## 2024-04-22 RX ORDER — ACETAMINOPHEN 500 MG
1000 TABLET ORAL ONCE
Refills: 0 | Status: COMPLETED | OUTPATIENT
Start: 2024-04-22 | End: 2024-04-22

## 2024-04-22 RX ORDER — LATANOPROST 0.05 MG/ML
1 SOLUTION/ DROPS OPHTHALMIC; TOPICAL AT BEDTIME
Refills: 0 | Status: DISCONTINUED | OUTPATIENT
Start: 2024-04-22 | End: 2024-04-25

## 2024-04-22 RX ORDER — ONDANSETRON 8 MG/1
4 TABLET, FILM COATED ORAL EVERY 8 HOURS
Refills: 0 | Status: COMPLETED | OUTPATIENT
Start: 2024-04-22 | End: 2024-04-24

## 2024-04-22 RX ORDER — SENNA PLUS 8.6 MG/1
2 TABLET ORAL AT BEDTIME
Refills: 0 | Status: DISCONTINUED | OUTPATIENT
Start: 2024-04-22 | End: 2024-04-25

## 2024-04-22 RX ORDER — CHLORHEXIDINE GLUCONATE 213 G/1000ML
1 SOLUTION TOPICAL ONCE
Refills: 0 | Status: COMPLETED | OUTPATIENT
Start: 2024-04-22 | End: 2024-04-22

## 2024-04-22 RX ORDER — MAGNESIUM SULFATE 500 MG/ML
1 VIAL (ML) INJECTION ONCE
Refills: 0 | Status: COMPLETED | OUTPATIENT
Start: 2024-04-22 | End: 2024-04-22

## 2024-04-22 RX ORDER — LOSARTAN POTASSIUM 100 MG/1
25 TABLET, FILM COATED ORAL DAILY
Refills: 0 | Status: DISCONTINUED | OUTPATIENT
Start: 2024-04-22 | End: 2024-04-25

## 2024-04-22 RX ORDER — CARVEDILOL PHOSPHATE 80 MG/1
1 CAPSULE, EXTENDED RELEASE ORAL
Refills: 0 | DISCHARGE

## 2024-04-22 RX ORDER — CEFTRIAXONE 500 MG/1
2000 INJECTION, POWDER, FOR SOLUTION INTRAMUSCULAR; INTRAVENOUS EVERY 24 HOURS
Refills: 0 | Status: DISCONTINUED | OUTPATIENT
Start: 2024-04-23 | End: 2024-04-23

## 2024-04-22 RX ORDER — ONDANSETRON 8 MG/1
4 TABLET, FILM COATED ORAL EVERY 6 HOURS
Refills: 0 | Status: DISCONTINUED | OUTPATIENT
Start: 2024-04-22 | End: 2024-04-25

## 2024-04-22 RX ORDER — OLANZAPINE 15 MG/1
1 TABLET, FILM COATED ORAL
Refills: 0 | DISCHARGE

## 2024-04-22 RX ORDER — AMLODIPINE BESYLATE 2.5 MG/1
1 TABLET ORAL
Refills: 0 | DISCHARGE

## 2024-04-22 RX ADMIN — ONDANSETRON 4 MILLIGRAM(S): 8 TABLET, FILM COATED ORAL at 22:24

## 2024-04-22 RX ADMIN — CHLORHEXIDINE GLUCONATE 1 APPLICATION(S): 213 SOLUTION TOPICAL at 10:30

## 2024-04-22 RX ADMIN — Medication 100 GRAM(S): at 19:21

## 2024-04-22 RX ADMIN — OXYCODONE HYDROCHLORIDE 10 MILLIGRAM(S): 5 TABLET ORAL at 22:20

## 2024-04-22 RX ADMIN — Medication 1000 MILLIGRAM(S): at 19:44

## 2024-04-22 RX ADMIN — Medication 1 APPLICATION(S): at 10:50

## 2024-04-22 RX ADMIN — OLANZAPINE 10 MILLIGRAM(S): 15 TABLET, FILM COATED ORAL at 22:25

## 2024-04-22 RX ADMIN — CEFTRIAXONE 100 MILLIGRAM(S): 500 INJECTION, POWDER, FOR SOLUTION INTRAMUSCULAR; INTRAVENOUS at 23:18

## 2024-04-22 RX ADMIN — OXYCODONE HYDROCHLORIDE 10 MILLIGRAM(S): 5 TABLET ORAL at 20:30

## 2024-04-22 RX ADMIN — NICARDIPINE HYDROCHLORIDE 25 MG/HR: 30 CAPSULE, EXTENDED RELEASE ORAL at 20:33

## 2024-04-22 RX ADMIN — LATANOPROST 1 DROP(S): 0.05 SOLUTION/ DROPS OPHTHALMIC; TOPICAL at 22:25

## 2024-04-22 RX ADMIN — Medication 1000 MILLIGRAM(S): at 22:25

## 2024-04-22 RX ADMIN — POLYETHYLENE GLYCOL 3350 17 GRAM(S): 17 POWDER, FOR SOLUTION ORAL at 22:20

## 2024-04-22 RX ADMIN — SENNA PLUS 2 TABLET(S): 8.6 TABLET ORAL at 22:20

## 2024-04-22 RX ADMIN — Medication 400 MILLIGRAM(S): at 19:21

## 2024-04-22 RX ADMIN — OXYCODONE HYDROCHLORIDE 5 MILLIGRAM(S): 5 TABLET ORAL at 23:19

## 2024-04-22 RX ADMIN — APREPITANT 40 MILLIGRAM(S): 80 CAPSULE ORAL at 10:48

## 2024-04-22 RX ADMIN — CARVEDILOL PHOSPHATE 25 MILLIGRAM(S): 80 CAPSULE, EXTENDED RELEASE ORAL at 23:18

## 2024-04-22 RX ADMIN — Medication 1000 MILLIGRAM(S): at 22:54

## 2024-04-22 RX ADMIN — OXYCODONE HYDROCHLORIDE 5 MILLIGRAM(S): 5 TABLET ORAL at 23:47

## 2024-04-22 NOTE — PRE-ANESTHESIA EVALUATION ADULT - NSPREOPDXFT_GEN_ALL_CORE
Phone call to patient and spoke with her daughter and advised received form for PA and will try to complete this today  Will call when it is complete   Pituitary adenoma

## 2024-04-22 NOTE — H&P ADULT - ASSESSMENT
Aniyah Coleman  64 y/o female with PMHx of HTN, anxiety, bipolar/schizophrenia, tremors, glaucoma, incidentally found to have pituitary mass while admitted at [facility] for psychosis Feb 2022.Following pituitary lesion dx, patient has been following with endocrinologist Dr. Caesar Thakkar. Endo labs notable for cortisol 23.3. Recently saw neurologist Dr. Hightower for evaluation of pituitary lesion who ordered new MRI for further evaluation which she completed 11/13/23 which showed 2 x 2 x 2 cm right pituitary mass with erosion of the dorsal clivus and extension into the suprasellar cistern. Repeat MRI at 4 months shows increase in size. Patient is now here for endoscopic endonasal resection of pituitary mass.     -ICU for post op care

## 2024-04-22 NOTE — PROGRESS NOTE ADULT - SUBJECTIVE AND OBJECTIVE BOX
NEUROSURGERY POST OP NOTE:    POD# 0 S/P Endoscopic transphenoidal or transnasal resection of pituitary tumor    S:     T(C): 36.8 (04-22-24 @ 17:45), Max: 36.8 (04-22-24 @ 17:45)  HR: 67 (04-22-24 @ 18:00) (60 - 67)  BP: 128/71 (04-22-24 @ 18:00) (128/71 - 162/90)  RR: 19 (04-22-24 @ 18:00) (18 - 20)  SpO2: 99% (04-22-24 @ 18:00) (85% - 99%)    acetaminophen     Tablet .. 1000 milliGRAM(s) Oral once  acetaminophen     Tablet .. 1000 milliGRAM(s) Oral every 8 hours  amLODIPine   Tablet 5 milliGRAM(s) Oral daily  benztropine 1 milliGRAM(s) Oral daily  carvedilol 25 milliGRAM(s) Oral every 12 hours  cefTRIAXone   IVPB 1000 milliGRAM(s) IV Intermittent every 12 hours  latanoprost 0.005% Ophthalmic Solution 1 Drop(s) Both EYES at bedtime  losartan 25 milliGRAM(s) Oral daily  OLANZapine 10 milliGRAM(s) Oral daily  ondansetron   Disintegrating Tablet 4 milliGRAM(s) Oral every 8 hours  ondansetron Injectable 4 milliGRAM(s) IV Push every 6 hours PRN  oxyCODONE    IR 5 milliGRAM(s) Oral every 4 hours PRN  oxyCODONE    IR 10 milliGRAM(s) Oral every 4 hours PRN  polyethylene glycol 3350 17 Gram(s) Oral daily  senna 2 Tablet(s) Oral at bedtime  sodium chloride 0.9%. 1000 milliLiter(s) IV Continuous <Continuous>    Exam:  General: Pt is sitting up comfortably in bed, in NAD, on RA  HEENT: CN II-XII grossly intact, PERRL 3mm, EOMI B/L, face symmetric, tongue midline, neck FROM  Cardiovascular: RRR, normal S1 and S2   Respiratory: non-labored breathing, symmetric chest rise   GI: abd soft, NTND   Neuro: A&O x 3, no aphasia, speech clear, no dysmetria, no pronator drift  Strength 5/5 throughout all 4 extremities  Sensation intact to light touch throughout   Vascular: Distal pulses 2+ x4, no calf edema or erythema  Wounds: Mustache dressing C/D/I    DEVICES: SCDs     Assessment:       Plan:  Neuro:  - Neuro/vitals q1hr.   - Pain control: cranial ERAS  - Post-op MRI w/in 72 hrs.  - Skullbase precautions     ENT:   - Peña splints BL, remove outpatient in 2 weeks  - Ceftriaxone while in house, Augmentin on DC    Cards:   - -140    Pulm:  - RA  - NO incentive spirometry     GI:  - ADAT  - Bowel regimen     Renal:  - IVF until adequate PO intake   - +Shrestha, remove in AM    Endo:  - ISS  - F/U A1c  - DI watch: strict I's and O's   - Endo consult pending     Heme:   - SCDs for DVT prophylaxis     ID:  - Afebrile     Discussed with Dr. D'Amico and Dr. Lawton

## 2024-04-22 NOTE — PROGRESS NOTE ADULT - SUBJECTIVE AND OBJECTIVE BOX
=================================  NEUROCRITICAL CARE ATTENDING NOTE  =================================    PERLA MAC   MRN-[id number]  Summary:  63y/F with HTN, anxiety, bipolar/schizophrenia, tremors, glaucoma, incidentally found to have pituitary mass while admitted at [facility] for psychosis Feb 2022.Following pituitary lesion dx, patient has been following with endocrinologist Dr. Caesar Thakkar. Endo labs notable for cortisol 23.3. Recently saw neurologist Dr. Hightower for evaluation of pituitary lesion who ordered new MRI for further evaluation which she completed 11/13/23 which showed 2 x 2 x 2 cm right pituitary mass with erosion of the dorsal clivus and extension into the suprasellar cistern. Repeat MRI at 4 months shows increase in size. Patient is now here for endoscopic endonasal resection of pituitary mass.   (22 Apr 2024 12:37)    COURSE IN THE HOSPITAL:  04/22 admitted to [facility], POD0    Past Medical History: Pituitary mass Morbid obesity HTN (hypertension) Schizophrenia Glaucoma Schizophrenia, schizoaffective Prediabetes  Allergies:  No Known Allergies  Home meds:   ·	acetaminophen 500 mg oral tablet: 2 tab(s) orally every 4 to 6 hours  ·	amLODIPine 5 mg oral tablet: 1 tab(s) orally once a day  ·	benztropine 1 mg oral tablet: 1 tab(s) orally once a day  ·	carvedilol 25 mg oral tablet: 1 tab(s) orally 2 times a day  ·	docusate 200 mg rectal enema: rectal once a day  ·	latanoprost 0.005% ophthalmic emulsion: 1 drop(s) in each affected eye once a day (at bedtime) both eyes  ·	losartan 25 mg oral tablet: 1 tab(s) orally once a day  ·	Multiple Vitamins oral capsule: 1 cap(s) orally once a day  ·	OLANZapine 10 mg oral tablet: 1 tab(s) orally once a day    PHYSICAL EXAMINATION  T(C): 36.6 (04-22 @ 12:50), Max: 36.6 (04-22 @ 12:50) HR: 60 (04-22 @ 12:50) (60 - 60) BP: 159/97 (04-22 @ 12:50) (159/97 - 159/97) RR: 18 (04-22 @ 12:50) (18 - 18) SpO2: 97% (04-22 @ 12:50) (97% - 97%)  NEUROLOGIC EXAMINATION:  Patient is waking up from anesthesia, nonfocal motor exam  GENERAL: not intubated, not in cardiorespiratory distress  EENT:  anicteric  CARDIOVASCULAR: (+) S1 S2, normal rate and regular rhythm  PULMONARY: clear to auscultation bilaterally  ABDOMEN: soft, nontender with normoactive bowel sounds  EXTREMITIES: no edema  SKIN: no rash    LABS:  CAPILLARY BLOOD GLUCOSE 89    pending    Bacteriology:  CSF studies:  EEG:  Neuroimaging:  Other imaging:    MEDICATIONS: 04-22    ·	cefTRIAXone   IVPB 1000 IV Intermittent every 12 hours  ·	acetaminophen     Tablet .. 1000 Oral every 8 hours  ·	benztropine 1 Oral daily  ·	OLANZapine 10 Oral daily  ·	ondansetron   Disintegrating Tablet 4 Oral every 8 hours  ·	amLODIPine   Tablet 5 milliGRAM(s) Oral daily  ·	carvedilol 25 milliGRAM(s) Oral every 12 hours  ·	losartan 25 milliGRAM(s) Oral daily  ·	polyethylene glycol 3350 17 Oral daily  ·	senna 2 Oral at bedtime  ·	latanoprost 0.005% Ophthalmic Solution 1 Both EYES at bedtime  ·	ondansetron Injectable 4 IV Push every 6 hours PRN  ·	oxyCODONE    IR 5 Oral every 4 hours PRN  ·	oxyCODONE    IR 10 Oral every 4 hours PRN    IV FLUIDS:   DRIPS:  DIET:  Lines:  Drains:    Wounds:    CODE STATUS:  Full Code                       GOALS OF CARE:  aggressive                      DISPOSITION:  ICU

## 2024-04-22 NOTE — PROGRESS NOTE ADULT - ASSESSMENT
63f hx, bipolar, schizophrenia, admit for pituitary adenoma now s/p TSP resection    -ncq1, pituitary precautions   -drink to thirst; keep euvolemic    -resume home HTN meds- carvedilol, losartan, amlodipine SBP goal 100-140; wean nicardipine  -Pending MRI brain   pain management w/ Tylenol & oxycodone   -monitor for DI - 1 AM BMP  -8am cortisol   -c/w psych meds   -drink to thirst; wiggins in place remove in AM

## 2024-04-22 NOTE — PROGRESS NOTE ADULT - ASSESSMENT
63y/F with  incidental pituitary mass  Hypertension, prediabetes  schizophrenia,   morbid obesity  glaucoma    PLAN:   NEURO: neurochecks q1h, PRN pain meds with acetaminophen / opiates  MRI on stepdown, f/u final histopathology, steroids as per endo  DI watch, CSF leak watch  REHAB:  physical therapy evaluation and management    EARLY MOB:  HOB up    PULM:  O2 as needed to keep sats >92%, skull base precautions, no incentive spirometry  CARDIO:  SBP goal 100-140mm Hg; cardene drip to SBP goal; restart home antihypertensives  ENDO:  Blood sugar goals 140-180 mg/dL, continue insulin sliding scale; daily cortisol levels  GI:  bowel regimen  DIET: advance diet as tolerated once more awake  RENAL:  IVL, d/c Shrestha, if UO>250cc/hr x 2 hours, send DI labs  HEM/ONC: check post-op Hb  VTE Prophylaxis: SCDs, no DVT chemoprophylaxis (fresh post-op)  ID: afebrile, no leukocytosis; ceftriaxone / amox/clav as per ENT  Social: will update family    Active issues:  What's keeping patient in the ICU? fresh post-op  What is this patient's dispo plan? stepdown tomorrow if stable    ATTENDING ATTESTATION:  I was physically present for the key portions of the evaluation and management (E/M) service provided.  I agree with the above history, physical and plan, which I have reviewed and edited where appropriate.    Patient at high risk for neurological deterioration or death due to:  ICU delirium, aspiration PNA, DVT / PE.  Critical care time:  I have personally provided 60 minutes of critical care time, excluding time spent on separate procedures.      Plan discussed with RN, house staff.

## 2024-04-22 NOTE — BRIEF OPERATIVE NOTE - OPERATION/FINDINGS
endoscopic endonasal approach for resection of pituitary mass. Small csf leak repaired with nasoseptal flap (frozen: pituitary tissue, cannot rule out adenoma) Endoscopic endonasal approach for resection of pituitary mass. Small csf leak repaired with nasoseptal flap (frozen: pituitary tissue, cannot rule out adenoma)

## 2024-04-22 NOTE — BRIEF OPERATIVE NOTE - OPERATION/FINDINGS
Endoscopic TSPR. sp R NSF, recon with duragen, NSF, adheris. Free mucosal graft to septum. bl lopez splints

## 2024-04-22 NOTE — BRIEF OPERATIVE NOTE - NSICDXBRIEFPROCEDURE_GEN_ALL_CORE_FT
PROCEDURES:  Endoscopic transphenoidal or transnasal resection of pituitary tumor 22-Apr-2024 16:28:06  Bruce Barker  
PROCEDURES:  Endoscopic transphenoidal or transnasal resection of pituitary tumor 22-Apr-2024 16:28:06  Bruce Barker

## 2024-04-22 NOTE — PROGRESS NOTE ADULT - SUBJECTIVE AND OBJECTIVE BOX
INTERVAL HISTORY: HPI:  Information below taken from outpatient neurosurgery records:    Aniyah Coleman  64 y/o female with PMHx of HTN, anxiety, bipolar/schizophrenia, tremors, glaucoma, incidentally found to have pituitary mass while admitted at [facility] for psychosis 2022.Following pituitary lesion dx, patient has been following with endocrinologist Dr. Caesar Thakkar. Endo labs notable for cortisol 23.3. Recently saw neurologist Dr. Hightower for evaluation of pituitary lesion who ordered new MRI for further evaluation which she completed 23 which showed 2 x 2 x 2 cm right pituitary mass with erosion of the dorsal clivus and extension into the suprasellar cistern. Repeat MRI at 4 months shows increase in size. Patient is now here for endoscopic endonasal resection of pituitary mass.      (2024 12:37)      MEDICATIONS  (STANDING):  acetaminophen     Tablet .. 1000 milliGRAM(s) Oral every 8 hours  amLODIPine   Tablet 5 milliGRAM(s) Oral daily  benztropine 1 milliGRAM(s) Oral daily  carvedilol 25 milliGRAM(s) Oral every 12 hours  latanoprost 0.005% Ophthalmic Solution 1 Drop(s) Both EYES at bedtime  losartan 25 milliGRAM(s) Oral daily  niCARdipine Infusion 5 mG/Hr (25 mL/Hr) IV Continuous <Continuous>  OLANZapine 10 milliGRAM(s) Oral daily  ondansetron   Disintegrating Tablet 4 milliGRAM(s) Oral every 8 hours  polyethylene glycol 3350 17 Gram(s) Oral daily  senna 2 Tablet(s) Oral at bedtime    MEDICATIONS  (PRN):  ondansetron Injectable 4 milliGRAM(s) IV Push every 6 hours PRN Nausea and/or Vomiting  oxyCODONE    IR 5 milliGRAM(s) Oral every 4 hours PRN Moderate Pain (4 - 6)  oxyCODONE    IR 10 milliGRAM(s) Oral every 4 hours PRN Severe Pain (7 - 10)      Drug Dosing Weight  Height (cm): 170.2 (2024 12:50)  Weight (kg): 112.2 (2024 12:50)  BMI (kg/m2): 38.7 (2024 12:50)  BSA (m2): 2.21 (2024 12:50)    PAST MEDICAL & SURGICAL HISTORY:  Pituitary mass  Morbid obesity  HTN (hypertension)  Glaucoma  Schizophrenia, schizoaffective  Prediabetes  History of 2  sections    REVIEW OF SYSTEMS: [ ] Unable to Assess due to neurologic exam   [ ] All ROS addressed below are non-contributory, except:  Neuro: [ ] Headache [ ] Back pain [ ] Numbness [ ] Weakness [ ] Ataxia [ ] Dizziness [ ] Aphasia [ ] Dysarthria [ ] Visual disturbance  Resp: [ ] Shortness of breath/dyspnea, [ ] Orthopnea [ ] Cough  CV: [ ] Chest pain [ ] Palpitation [ ] Lightheadedness [ ] Syncope  Renal: [ ] Thirst [ ] Edema  GI: [ ] Nausea [ ] Emesis [ ] Abdominal pain [ ] Constipation [ ] Diarrhea  Hem: [ ] Hematemesis [ ] bright red blood per rectum  ID: [ ] Fever [ ] Chills [ ] Dysuria  ENT: [ ] Rhinorrhea    PHYSICAL EXAM:    General: No Acute Distress   Neurological: orofacial dyskinesia, Awake, alert oriented to person, place and time, Following Commands, PERRL, EOMI, Face Symmetrical, Speech Fluent, Moving all extremities, Muscle Strength normal in all four extremities, No Drift, Sensation to Light Touch Intact  Pulmonary: Clear to Auscultation, No Rales, No Rhonchi, No Wheezes   Cardiovascular: S1, S2, Regular Rate and Rhythm  Gastrointestinal: Soft, Nontender, Nondistended   Extremities: No calf tenderness       ICU Vital Signs Last 24 Hrs  T(C): 37.9 (2024 21:43), Max: 38 (2024 20:30)  T(F): 100.2 (2024 21:43), Max: 100.4 (2024 20:30)  HR: 63 (2024 22:00) (60 - 70)  BP: 118/66 (2024 22:00) (116/65 - 162/90)  BP(mean): 87 (2024 22:00) (85 - 120)  ABP: 128/63 (2024 22:00) (104/97 - 154/67)  ABP(mean): 83 (2024 22:00) (82 - 116)  RR: 18 (2024 22:00) (18 - 20)  SpO2: 93% (2024 22:00) (85% - 99%)    O2 Parameters below as of 2024 22:00  Patient On (Oxygen Delivery Method): room air      ABG - ( 2024 13:49 )  pH, Arterial: 7.38  pH, Blood: x     /  pCO2: 42    /  pO2: 239   / HCO3: 25    / Base Excess: -0.4  /  SaO2: x           I&O's Detail    2024 07:01  -  2024 22:34  --------------------------------------------------------  IN:    IV PiggyBack: 200 mL    NiCARdipine: 250 mL  Total IN: 450 mL    OUT:    Indwelling Catheter - Urethral (mL): 1530 mL  Total OUT: 1530 mL    Total NET: -1080 mL    LABS:  CBC Full  -  ( 2024 17:57 )  WBC Count : 6.62 K/uL  RBC Count : 4.39 M/uL  Hemoglobin : 13.1 g/dL  Hematocrit : 39.8 %  Platelet Count - Automated : 240 K/uL  Mean Cell Volume : 90.7 fl  Mean Cell Hemoglobin : 29.8 pg  Mean Cell Hemoglobin Concentration : 32.9 gm/dL  Auto Neutrophil # : x  Auto Lymphocyte # : x  Auto Monocyte # : x  Auto Eosinophil # : x  Auto Basophil # : x  Auto Neutrophil % : x  Auto Lymphocyte % : x  Auto Monocyte % : x  Auto Eosinophil % : x  Auto Basophil % : x        140  |  105  |  10  ----------------------------<  122<H>  4.4   |  24  |  0.68    Ca    9.9      2024 17:57  Phos  3.2     -22  Mg     1.9     -22    TPro  6.4  /  Alb  4.0  /  TBili  0.2  /  DBili  x   /  AST  18  /  ALT  26  /  AlkPhos  95  04-22    PT/INR - ( 2024 17:57 )   PT: 13.2 sec;   INR: 1.16          PTT - ( 2024 17:57 )  PTT:25.0 sec  Urinalysis Basic - ( 2024 17:57 )    Color: x / Appearance: x / SG: x / pH: x  Gluc: 122 mg/dL / Ketone: x  / Bili: x / Urobili: x   Blood: x / Protein: x / Nitrite: x   Leuk Esterase: x / RBC: x / WBC x   Sq Epi: x / Non Sq Epi: x / Bacteria: x    RADIOLOGY & ADDITIONAL STUDIES: reviewed

## 2024-04-22 NOTE — H&P ADULT - HISTORY OF PRESENT ILLNESS
Information below taken from outpatient neurosurgery records:    Aniyah Coleman  62 y/o female with PMHx of HTN, anxiety, bipolar/schizophrenia, tremors, glaucoma, incidentally found to have pituitary mass while admitted at [facility] for psychosis Feb 2022.Following pituitary lesion dx, patient has been following with endocrinologist Dr. Caesar Thakkar. Endo labs notable for cortisol 23.3. Recently saw neurologist Dr. Hightower for evaluation of pituitary lesion who ordered new MRI for further evaluation which she completed 11/13/23 which showed 2 x 2 x 2 cm right pituitary mass with erosion of the dorsal clivus and extension into the suprasellar cistern. Repeat MRI at 4 months shows increase in size. Patient is now here for endoscopic endonasal resection of pituitary mass.

## 2024-04-23 LAB
A1C WITH ESTIMATED AVERAGE GLUCOSE RESULT: 5.4 % — SIGNIFICANT CHANGE UP (ref 4–5.6)
ADD ON TEST-SPECIMEN IN LAB: SIGNIFICANT CHANGE UP
ANION GAP SERPL CALC-SCNC: 10 MMOL/L — SIGNIFICANT CHANGE UP (ref 5–17)
ANION GAP SERPL CALC-SCNC: 11 MMOL/L — SIGNIFICANT CHANGE UP (ref 5–17)
ANION GAP SERPL CALC-SCNC: 11 MMOL/L — SIGNIFICANT CHANGE UP (ref 5–17)
ANION GAP SERPL CALC-SCNC: 12 MMOL/L — SIGNIFICANT CHANGE UP (ref 5–17)
APPEARANCE UR: CLEAR — SIGNIFICANT CHANGE UP
BILIRUB UR-MCNC: NEGATIVE — SIGNIFICANT CHANGE UP
BUN SERPL-MCNC: 10 MG/DL — SIGNIFICANT CHANGE UP (ref 7–23)
BUN SERPL-MCNC: 13 MG/DL — SIGNIFICANT CHANGE UP (ref 7–23)
BUN SERPL-MCNC: 16 MG/DL — SIGNIFICANT CHANGE UP (ref 7–23)
BUN SERPL-MCNC: 9 MG/DL — SIGNIFICANT CHANGE UP (ref 7–23)
CALCIUM SERPL-MCNC: 8.8 MG/DL — SIGNIFICANT CHANGE UP (ref 8.4–10.5)
CALCIUM SERPL-MCNC: 9.5 MG/DL — SIGNIFICANT CHANGE UP (ref 8.4–10.5)
CALCIUM SERPL-MCNC: 9.5 MG/DL — SIGNIFICANT CHANGE UP (ref 8.4–10.5)
CALCIUM SERPL-MCNC: 9.6 MG/DL — SIGNIFICANT CHANGE UP (ref 8.4–10.5)
CHLORIDE SERPL-SCNC: 100 MMOL/L — SIGNIFICANT CHANGE UP (ref 96–108)
CHLORIDE SERPL-SCNC: 100 MMOL/L — SIGNIFICANT CHANGE UP (ref 96–108)
CHLORIDE SERPL-SCNC: 101 MMOL/L — SIGNIFICANT CHANGE UP (ref 96–108)
CHLORIDE SERPL-SCNC: 98 MMOL/L — SIGNIFICANT CHANGE UP (ref 96–108)
CO2 SERPL-SCNC: 21 MMOL/L — LOW (ref 22–31)
CO2 SERPL-SCNC: 22 MMOL/L — SIGNIFICANT CHANGE UP (ref 22–31)
CO2 SERPL-SCNC: 22 MMOL/L — SIGNIFICANT CHANGE UP (ref 22–31)
CO2 SERPL-SCNC: 24 MMOL/L — SIGNIFICANT CHANGE UP (ref 22–31)
COLOR SPEC: YELLOW — SIGNIFICANT CHANGE UP
CORTIS AM PEAK SERPL-MCNC: 45.69 UG/DL — HIGH (ref 6.02–18.4)
CREAT SERPL-MCNC: 0.61 MG/DL — SIGNIFICANT CHANGE UP (ref 0.5–1.3)
CREAT SERPL-MCNC: 0.69 MG/DL — SIGNIFICANT CHANGE UP (ref 0.5–1.3)
CREAT SERPL-MCNC: 0.71 MG/DL — SIGNIFICANT CHANGE UP (ref 0.5–1.3)
CREAT SERPL-MCNC: 0.85 MG/DL — SIGNIFICANT CHANGE UP (ref 0.5–1.3)
DIFF PNL FLD: NEGATIVE — SIGNIFICANT CHANGE UP
EGFR: 101 ML/MIN/1.73M2 — SIGNIFICANT CHANGE UP
EGFR: 77 ML/MIN/1.73M2 — SIGNIFICANT CHANGE UP
EGFR: 96 ML/MIN/1.73M2 — SIGNIFICANT CHANGE UP
EGFR: 98 ML/MIN/1.73M2 — SIGNIFICANT CHANGE UP
ESTIMATED AVERAGE GLUCOSE: 108 MG/DL — SIGNIFICANT CHANGE UP (ref 68–114)
GLUCOSE SERPL-MCNC: 117 MG/DL — HIGH (ref 70–99)
GLUCOSE SERPL-MCNC: 143 MG/DL — HIGH (ref 70–99)
GLUCOSE SERPL-MCNC: 148 MG/DL — HIGH (ref 70–99)
GLUCOSE SERPL-MCNC: 152 MG/DL — HIGH (ref 70–99)
GLUCOSE UR QL: NEGATIVE MG/DL — SIGNIFICANT CHANGE UP
HCT VFR BLD CALC: 39.5 % — SIGNIFICANT CHANGE UP (ref 34.5–45)
HCT VFR BLD CALC: 39.7 % — SIGNIFICANT CHANGE UP (ref 34.5–45)
HCV AB S/CO SERPL IA: 0.04 S/CO — SIGNIFICANT CHANGE UP
HCV AB SERPL-IMP: SIGNIFICANT CHANGE UP
HGB BLD-MCNC: 13.1 G/DL — SIGNIFICANT CHANGE UP (ref 11.5–15.5)
HGB BLD-MCNC: 13.1 G/DL — SIGNIFICANT CHANGE UP (ref 11.5–15.5)
KETONES UR-MCNC: NEGATIVE MG/DL — SIGNIFICANT CHANGE UP
LEUKOCYTE ESTERASE UR-ACNC: NEGATIVE — SIGNIFICANT CHANGE UP
MAGNESIUM SERPL-MCNC: 1.9 MG/DL — SIGNIFICANT CHANGE UP (ref 1.6–2.6)
MAGNESIUM SERPL-MCNC: 2.5 MG/DL — SIGNIFICANT CHANGE UP (ref 1.6–2.6)
MCHC RBC-ENTMCNC: 29.4 PG — SIGNIFICANT CHANGE UP (ref 27–34)
MCHC RBC-ENTMCNC: 29.4 PG — SIGNIFICANT CHANGE UP (ref 27–34)
MCHC RBC-ENTMCNC: 33 GM/DL — SIGNIFICANT CHANGE UP (ref 32–36)
MCHC RBC-ENTMCNC: 33.2 GM/DL — SIGNIFICANT CHANGE UP (ref 32–36)
MCV RBC AUTO: 88.6 FL — SIGNIFICANT CHANGE UP (ref 80–100)
MCV RBC AUTO: 89.2 FL — SIGNIFICANT CHANGE UP (ref 80–100)
NITRITE UR-MCNC: NEGATIVE — SIGNIFICANT CHANGE UP
NRBC # BLD: 0 /100 WBCS — SIGNIFICANT CHANGE UP (ref 0–0)
NRBC # BLD: 0 /100 WBCS — SIGNIFICANT CHANGE UP (ref 0–0)
OSMOLALITY UR: 188 MOSM/KG — LOW (ref 300–900)
PH UR: 6 — SIGNIFICANT CHANGE UP (ref 5–8)
PHOSPHATE SERPL-MCNC: 2.9 MG/DL — SIGNIFICANT CHANGE UP (ref 2.5–4.5)
PHOSPHATE SERPL-MCNC: 3.9 MG/DL — SIGNIFICANT CHANGE UP (ref 2.5–4.5)
PLATELET # BLD AUTO: 247 K/UL — SIGNIFICANT CHANGE UP (ref 150–400)
PLATELET # BLD AUTO: 263 K/UL — SIGNIFICANT CHANGE UP (ref 150–400)
POTASSIUM SERPL-MCNC: 3.9 MMOL/L — SIGNIFICANT CHANGE UP (ref 3.5–5.3)
POTASSIUM SERPL-MCNC: 3.9 MMOL/L — SIGNIFICANT CHANGE UP (ref 3.5–5.3)
POTASSIUM SERPL-MCNC: 4.2 MMOL/L — SIGNIFICANT CHANGE UP (ref 3.5–5.3)
POTASSIUM SERPL-MCNC: 4.4 MMOL/L — SIGNIFICANT CHANGE UP (ref 3.5–5.3)
POTASSIUM SERPL-SCNC: 3.9 MMOL/L — SIGNIFICANT CHANGE UP (ref 3.5–5.3)
POTASSIUM SERPL-SCNC: 3.9 MMOL/L — SIGNIFICANT CHANGE UP (ref 3.5–5.3)
POTASSIUM SERPL-SCNC: 4.2 MMOL/L — SIGNIFICANT CHANGE UP (ref 3.5–5.3)
POTASSIUM SERPL-SCNC: 4.4 MMOL/L — SIGNIFICANT CHANGE UP (ref 3.5–5.3)
PROT UR-MCNC: NEGATIVE MG/DL — SIGNIFICANT CHANGE UP
RBC # BLD: 4.45 M/UL — SIGNIFICANT CHANGE UP (ref 3.8–5.2)
RBC # BLD: 4.46 M/UL — SIGNIFICANT CHANGE UP (ref 3.8–5.2)
RBC # FLD: 14.5 % — SIGNIFICANT CHANGE UP (ref 10.3–14.5)
RBC # FLD: 14.5 % — SIGNIFICANT CHANGE UP (ref 10.3–14.5)
SODIUM SERPL-SCNC: 132 MMOL/L — LOW (ref 135–145)
SODIUM SERPL-SCNC: 132 MMOL/L — LOW (ref 135–145)
SODIUM SERPL-SCNC: 133 MMOL/L — LOW (ref 135–145)
SODIUM SERPL-SCNC: 135 MMOL/L — SIGNIFICANT CHANGE UP (ref 135–145)
SODIUM UR-SCNC: 20 MMOL/L — SIGNIFICANT CHANGE UP
SP GR SPEC: 1.01 — SIGNIFICANT CHANGE UP (ref 1–1.03)
UROBILINOGEN FLD QL: 0.2 MG/DL — SIGNIFICANT CHANGE UP (ref 0.2–1)
WBC # BLD: 12.19 K/UL — HIGH (ref 3.8–10.5)
WBC # BLD: 14.54 K/UL — HIGH (ref 3.8–10.5)
WBC # FLD AUTO: 12.19 K/UL — HIGH (ref 3.8–10.5)
WBC # FLD AUTO: 14.54 K/UL — HIGH (ref 3.8–10.5)

## 2024-04-23 PROCEDURE — 99291 CRITICAL CARE FIRST HOUR: CPT

## 2024-04-23 PROCEDURE — 99222 1ST HOSP IP/OBS MODERATE 55: CPT | Mod: GC

## 2024-04-23 PROCEDURE — 99232 SBSQ HOSP IP/OBS MODERATE 35: CPT

## 2024-04-23 RX ORDER — CHLORHEXIDINE GLUCONATE 213 G/1000ML
1 SOLUTION TOPICAL
Refills: 0 | Status: DISCONTINUED | OUTPATIENT
Start: 2024-04-23 | End: 2024-04-23

## 2024-04-23 RX ORDER — CEFTRIAXONE 500 MG/1
2000 INJECTION, POWDER, FOR SOLUTION INTRAMUSCULAR; INTRAVENOUS EVERY 12 HOURS
Refills: 0 | Status: DISCONTINUED | OUTPATIENT
Start: 2024-04-23 | End: 2024-04-25

## 2024-04-23 RX ORDER — MAGNESIUM SULFATE 500 MG/ML
1 VIAL (ML) INJECTION ONCE
Refills: 0 | Status: COMPLETED | OUTPATIENT
Start: 2024-04-23 | End: 2024-04-23

## 2024-04-23 RX ORDER — HYDRALAZINE HCL 50 MG
10 TABLET ORAL ONCE
Refills: 0 | Status: COMPLETED | OUTPATIENT
Start: 2024-04-23 | End: 2024-04-23

## 2024-04-23 RX ORDER — ENOXAPARIN SODIUM 100 MG/ML
40 INJECTION SUBCUTANEOUS
Refills: 0 | Status: DISCONTINUED | OUTPATIENT
Start: 2024-04-23 | End: 2024-04-25

## 2024-04-23 RX ORDER — SODIUM CHLORIDE 0.65 %
2 AEROSOL, SPRAY (ML) NASAL EVERY 4 HOURS
Refills: 0 | Status: DISCONTINUED | OUTPATIENT
Start: 2024-04-23 | End: 2024-04-25

## 2024-04-23 RX ORDER — SODIUM,POTASSIUM PHOSPHATES 278-250MG
1 POWDER IN PACKET (EA) ORAL ONCE
Refills: 0 | Status: COMPLETED | OUTPATIENT
Start: 2024-04-23 | End: 2024-04-23

## 2024-04-23 RX ORDER — POTASSIUM CHLORIDE 20 MEQ
10 PACKET (EA) ORAL ONCE
Refills: 0 | Status: COMPLETED | OUTPATIENT
Start: 2024-04-23 | End: 2024-04-23

## 2024-04-23 RX ADMIN — Medication 10 MILLIGRAM(S): at 08:47

## 2024-04-23 RX ADMIN — OXYCODONE HYDROCHLORIDE 10 MILLIGRAM(S): 5 TABLET ORAL at 22:44

## 2024-04-23 RX ADMIN — ONDANSETRON 4 MILLIGRAM(S): 8 TABLET, FILM COATED ORAL at 06:59

## 2024-04-23 RX ADMIN — AMLODIPINE BESYLATE 5 MILLIGRAM(S): 2.5 TABLET ORAL at 06:59

## 2024-04-23 RX ADMIN — Medication 1000 MILLIGRAM(S): at 21:17

## 2024-04-23 RX ADMIN — POLYETHYLENE GLYCOL 3350 17 GRAM(S): 17 POWDER, FOR SOLUTION ORAL at 21:19

## 2024-04-23 RX ADMIN — CEFTRIAXONE 100 MILLIGRAM(S): 500 INJECTION, POWDER, FOR SOLUTION INTRAMUSCULAR; INTRAVENOUS at 22:04

## 2024-04-23 RX ADMIN — Medication 2 SPRAY(S): at 21:19

## 2024-04-23 RX ADMIN — Medication 1000 MILLIGRAM(S): at 15:22

## 2024-04-23 RX ADMIN — Medication 1000 MILLIGRAM(S): at 13:55

## 2024-04-23 RX ADMIN — CEFTRIAXONE 100 MILLIGRAM(S): 500 INJECTION, POWDER, FOR SOLUTION INTRAMUSCULAR; INTRAVENOUS at 10:54

## 2024-04-23 RX ADMIN — Medication 1 PACKET(S): at 06:59

## 2024-04-23 RX ADMIN — SENNA PLUS 2 TABLET(S): 8.6 TABLET ORAL at 21:18

## 2024-04-23 RX ADMIN — CARVEDILOL PHOSPHATE 25 MILLIGRAM(S): 80 CAPSULE, EXTENDED RELEASE ORAL at 17:10

## 2024-04-23 RX ADMIN — Medication 1 MILLIGRAM(S): at 09:26

## 2024-04-23 RX ADMIN — OXYCODONE HYDROCHLORIDE 10 MILLIGRAM(S): 5 TABLET ORAL at 06:37

## 2024-04-23 RX ADMIN — OXYCODONE HYDROCHLORIDE 10 MILLIGRAM(S): 5 TABLET ORAL at 16:31

## 2024-04-23 RX ADMIN — ONDANSETRON 4 MILLIGRAM(S): 8 TABLET, FILM COATED ORAL at 13:55

## 2024-04-23 RX ADMIN — OXYCODONE HYDROCHLORIDE 10 MILLIGRAM(S): 5 TABLET ORAL at 04:20

## 2024-04-23 RX ADMIN — Medication 100 GRAM(S): at 06:58

## 2024-04-23 RX ADMIN — LATANOPROST 1 DROP(S): 0.05 SOLUTION/ DROPS OPHTHALMIC; TOPICAL at 22:04

## 2024-04-23 RX ADMIN — OXYCODONE HYDROCHLORIDE 10 MILLIGRAM(S): 5 TABLET ORAL at 10:00

## 2024-04-23 RX ADMIN — Medication 100 GRAM(S): at 03:35

## 2024-04-23 RX ADMIN — OXYCODONE HYDROCHLORIDE 10 MILLIGRAM(S): 5 TABLET ORAL at 23:44

## 2024-04-23 RX ADMIN — OXYCODONE HYDROCHLORIDE 10 MILLIGRAM(S): 5 TABLET ORAL at 09:56

## 2024-04-23 RX ADMIN — Medication 2 SPRAY(S): at 19:21

## 2024-04-23 RX ADMIN — LOSARTAN POTASSIUM 25 MILLIGRAM(S): 100 TABLET, FILM COATED ORAL at 06:59

## 2024-04-23 RX ADMIN — Medication 1000 MILLIGRAM(S): at 07:13

## 2024-04-23 RX ADMIN — ONDANSETRON 4 MILLIGRAM(S): 8 TABLET, FILM COATED ORAL at 21:18

## 2024-04-23 RX ADMIN — Medication 1000 MILLIGRAM(S): at 06:59

## 2024-04-23 RX ADMIN — Medication 10 MILLIEQUIVALENT(S): at 07:13

## 2024-04-23 RX ADMIN — OLANZAPINE 10 MILLIGRAM(S): 15 TABLET, FILM COATED ORAL at 22:03

## 2024-04-23 RX ADMIN — ENOXAPARIN SODIUM 40 MILLIGRAM(S): 100 INJECTION SUBCUTANEOUS at 21:18

## 2024-04-23 RX ADMIN — CHLORHEXIDINE GLUCONATE 1 APPLICATION(S): 213 SOLUTION TOPICAL at 09:56

## 2024-04-23 NOTE — OCCUPATIONAL THERAPY INITIAL EVALUATION ADULT - GENERAL OBSERVATIONS, REHAB EVAL
OT IE completed. Orders received, chart reviewed, pt cleared for OT by KELLY Escobar. Pt received sitting in recliner, NAD, +heplock, +a line, +tele. Pt A&Ox4, agreeable to OT, and tolerated session well.

## 2024-04-23 NOTE — PROGRESS NOTE ADULT - SUBJECTIVE AND OBJECTIVE BOX
S/Overnight events: POD1 from TSP resection for pituitary lesion. PRIYANK overnight, neuro stable. Offers no complaints at this time.       Hospital Course:   : POD 0 TSP resection for pituitary lesion. 1g IV tylenol for HA, oxy 10. Carvedilol x1, weaning cardene.   : POD 1 TSP rsxn for pituitary lesion. PRIYANK overnight. Neuro stable.     Vital Signs Last 24 Hrs  T(C): 37.9 (2024 21:43), Max: 38 (2024 20:30)  T(F): 100.2 (2024 21:43), Max: 100.4 (2024 20:30)  HR: 69 (2024 00:00) (60 - 70)  BP: 124/66 (2024 00:00) (116/65 - 162/90)  BP(mean): 89 (2024 00:00) (85 - 120)  RR: 18 (2024 00:00) (18 - 20)  SpO2: 91% (2024 00:00) (85% - 99%)    Parameters below as of 2024 00:00  Patient On (Oxygen Delivery Method): room air        I&O's Detail    2024 07:01  -  2024 00:48  --------------------------------------------------------  IN:    IV PiggyBack: 200 mL    NiCARdipine: 250 mL    Oral Fluid: 1659 mL  Total IN: 2109 mL    OUT:    Indwelling Catheter - Urethral (mL): 1730 mL  Total OUT: 1730 mL    Total NET: 379 mL        I&O's Summary    2024 07:01  -  2024 00:48  --------------------------------------------------------  IN: 2109 mL / OUT: 1730 mL / NET: 379 mL        PHYSICAL EXAM:  General: NAD, pt is comfortably sitting up in bed, on room air  HEENT: (+) moustache dressing, (+) b/l lopez splints in place  CN II-XII intact, PERRL 3mm briskly reactive, EOMI b/l, (+) VF intact, face symmetric, tongue midline, neck FROM  Cardiovascular: RRR, S1, S2  Respiratory: non-labored breathing on RA, chest rise symmetric  GI: abd soft, NTND   Neuro: A&Ox3, No aphasia, speech clear, no dysmetria, no pronator drift. Follows commands.  SCANLON x4 spontaneously, 5/5 strength in all extremities throughout. Sensation intact  Extremities: distal pulses 2+ x4  Wound/incision: b/l lopez splints in place w moustache dressing   Drain: (+) Shrestha (+) a-line       TUBES/LINES:  [] CVC  [x] A-line  [] Lumbar Drain  [] Ventriculostomy  [x] Shrestha  [] Other    DIET:  [] NPO  [x] Mechanical  [] Tube feeds    LABS:                        13.1   6.62  )-----------( 240      ( 2024 17:57 )             39.8     04-    140  |  105  |  10  ----------------------------<  122<H>  4.4   |  24  |  0.68    Ca    9.9      2024 17:57  Phos  3.2     04-22  Mg     1.9     -22    TPro  6.4  /  Alb  4.0  /  TBili  0.2  /  DBili  x   /  AST  18  /  ALT  26  /  AlkPhos  95  04-22    PT/INR - ( 2024 17:57 )   PT: 13.2 sec;   INR: 1.16          PTT - ( 2024 17:57 )  PTT:25.0 sec  Urinalysis Basic - ( 2024 17:57 )    Color: x / Appearance: x / SG: x / pH: x  Gluc: 122 mg/dL / Ketone: x  / Bili: x / Urobili: x   Blood: x / Protein: x / Nitrite: x   Leuk Esterase: x / RBC: x / WBC x   Sq Epi: x / Non Sq Epi: x / Bacteria: x          CAPILLARY BLOOD GLUCOSE      POCT Blood Glucose.: 89 mg/dL (2024 10:58)      Drug Levels: [] N/A    CSF Analysis: [] N/A      Allergies    No Known Allergies    Intolerances      MEDICATIONS:  Antibiotics:  cefTRIAXone   IVPB 2000 milliGRAM(s) IV Intermittent every 24 hours    Neuro:  acetaminophen     Tablet .. 1000 milliGRAM(s) Oral every 8 hours  benztropine 1 milliGRAM(s) Oral daily  OLANZapine 10 milliGRAM(s) Oral daily  ondansetron   Disintegrating Tablet 4 milliGRAM(s) Oral every 8 hours  ondansetron Injectable 4 milliGRAM(s) IV Push every 6 hours PRN  oxyCODONE    IR 5 milliGRAM(s) Oral every 4 hours PRN  oxyCODONE    IR 10 milliGRAM(s) Oral every 4 hours PRN    Anticoagulation:    OTHER:  amLODIPine   Tablet 5 milliGRAM(s) Oral daily  carvedilol 25 milliGRAM(s) Oral every 12 hours  latanoprost 0.005% Ophthalmic Solution 1 Drop(s) Both EYES at bedtime  losartan 25 milliGRAM(s) Oral daily  niCARdipine Infusion 5 mG/Hr IV Continuous <Continuous>  polyethylene glycol 3350 17 Gram(s) Oral daily  senna 2 Tablet(s) Oral at bedtime    IVF:    CULTURES:    RADIOLOGY & ADDITIONAL TESTS:  pending MRI sella post-op    ASSESSMENT:  63F PMHx HTN, anxiety, bipolar/schizophrenia, tremors, glaucoma was incidentally found to have pituitary mass on workup for psychosis in , repeat MRI showed increase in size. S/p elective TSP resection for pituitary lesion (24). Frozen: pituitary tissue.    D35.2    Deviated nasal septum    Encounter for preprocedural cardiovascular examination    Handoff    Pituitary mass    Morbid obesity    HTN (hypertension)    Schizophrenia    Glaucoma    Schizophrenia, schizoaffective    Prediabetes    Pituitary tumor    Pituitary adenoma    Endoscopic transphenoidal or transnasal resection of pituitary tumor    No significant past surgical history    History of 2  sections    [id number]    PLAN:  Neuro:  - Neuro/vitals q1hr.   - Pain control: cranial ERAS  - Post-op MRI w/in 72 hrs  - Skullbase precautions   - Bipolar/schizophrenia: Olanzapine 10mg daily     ENT:   - Lopez splints BL, remove outpatient in 2 weeks  - Ceftriaxone 2g q24 while in house, Augmentin on DC    Cards:   - -140, cardene gtt   - HTN: Continue Amlodipine 5mg, carvedilol 25mg BID, losartan 25 daily     Pulm:  - RA  - NO incentive spirometry     GI:  - regular diet   - Bowel regimen     Renal:  - IVL  - +Shrestha, remove in AM    Endo:  - ISS  - F/U A1c  - DI watch: strict I's and O's   - Endo consult pending     Heme:   - SCDs for DVT prophylaxis     ID:  - Afebrile     Misc:  - Glaucoma: Latanoprost drops    Discussed with Dr. D'Amico and Dr. Montes    Assessment:  Present when checked    []  GCS  E   V  M     Heart Failure: []Acute, [] acute on chronic , []chronic  Heart Failure:  [] Diastolic (HFpEF), [] Systolic (HFrEF), []Combined (HFpEF and HFrEF), [] RHF, [] Pulm HTN, [] Other    [] MICHAEL, [] ATN, [] AIN, [] other  [] CKD1, [] CKD2, [] CKD 3, [] CKD 4, [] CKD 5, []ESRD    Encephalopathy: [] Metabolic, [] Hepatic, [] toxic, [] Neurological, [] Other    Abnormal Nurtitional Status: [] malnurtition (see nutrition note), [ ]underweight: BMI < 19, [] morbid obesity: BMI >40, [] Cachexia    [] Sepsis  [] hypovolemic shock,[] cardiogenic shock, [] hemorrhagic shock, [] neuogenic shock  [] Acute Respiratory Failure  []Cerebral edema, [] Brain compression/ herniation,   [] Functional quadriplegia  [] Acute blood loss anemia

## 2024-04-23 NOTE — OCCUPATIONAL THERAPY INITIAL EVALUATION ADULT - MODALITIES TREATMENT COMMENTS
Cranial Nerves II - XII: II: PERRLA; visual fields are full to confrontation III, IV, VI: EOMI, no nystagmus appreciated V: facial sensation intact to light touch V1-V3 b/l VII: no ptosis, no facial droop, symmetric eyebrow raise and closure VIII: hearing intact to finger rub b/l  XI: head turning and shoulder shrug intact b/l XII: tongue protrusion midline // Pt able to perform sit<->stand and ambulation with Min Ax1 hand held assist, demo decreased step length and unsteadiness with impaired ability to weight shift, p/w bilateral sway during ambulation. Pt educated on appropriateness and benefits of using RW to facilitate increased independence and safety during ambulation, however pt refusing to use RW at this time, despite maximum encouragement.

## 2024-04-23 NOTE — PROGRESS NOTE ADULT - SUBJECTIVE AND OBJECTIVE BOX
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    Subjective/ Interval:   ; patient seen this morning, no overnight events, UOP in range,  start saline sprays today, lopez splints will remain in place for a total of two weeks   ALLERGIES:  No Known Allergies      MEDICATIONS:  Antiinfectives:   cefTRIAXone   IVPB 2000 milliGRAM(s) IV Intermittent every 24 hours    IV fluids:    Hematologic/Anticoagulation:    Pain medications/Neuro:  acetaminophen     Tablet .. 1000 milliGRAM(s) Oral every 8 hours  benztropine 1 milliGRAM(s) Oral daily  OLANZapine 10 milliGRAM(s) Oral daily  ondansetron   Disintegrating Tablet 4 milliGRAM(s) Oral every 8 hours  ondansetron Injectable 4 milliGRAM(s) IV Push every 6 hours PRN  oxyCODONE    IR 5 milliGRAM(s) Oral every 4 hours PRN  oxyCODONE    IR 10 milliGRAM(s) Oral every 4 hours PRN    Endocrine Medications:     All other standing medications:   amLODIPine   Tablet 5 milliGRAM(s) Oral daily  carvedilol 25 milliGRAM(s) Oral every 12 hours  latanoprost 0.005% Ophthalmic Solution 1 Drop(s) Both EYES at bedtime  losartan 25 milliGRAM(s) Oral daily  polyethylene glycol 3350 17 Gram(s) Oral daily  senna 2 Tablet(s) Oral at bedtime    All other PRN medications:    Vital Signs Last 24 Hrs  T(C): 37.2 (2024 05:19), Max: 38 (2024 20:30)  T(F): 99 (2024 05:19), Max: 100.4 (2024 20:30)  HR: 59 (2024 05:00) (59 - 70)  BP: 130/73 (2024 05:00) (114/60 - 162/90)  BP(mean): 94 (2024 05:00) (80 - 120)  RR: 18 (2024 05:00) (16 - 20)  SpO2: 94% (2024 05:00) (85% - 99%)    Parameters below as of 2024 01:00  Patient On (Oxygen Delivery Method): room air           @ 07:01  -   @ 07:00  --------------------------------------------------------  IN:    IV PiggyBack: 200 mL    NiCARdipine: 250 mL    Oral Fluid: 1659 mL  Total IN: 2109 mL    OUT:    Indwelling Catheter - Urethral (mL): 2000 mL  Total OUT: 2000 mL    Total NET: 109 mL                  PHYSICAL EXAM:  GEN: appears stated age, NAD  NEURO: alert & oriented x 4/4  HEENT lopez splints in place- mustache dressing applied   CVS: regular rate and rhythm  Pulm: normal respiratory excursions, not tachypneic, no labored breathing  Abd: non-distended  Ext: moving all four extremities, no peripheral edema noted       LABS                       13.1   14.54 )-----------( 247      ( 2024 00:48 )             39.7    0423    132<L>  |  100  |  9   ----------------------------<  148<H>  3.9   |  22  |  0.61    Ca    8.8      2024 00:48  Phos  2.9     04-23  Mg     1.9     23    TPro  6.4  /  Alb  4.0  /  TBili  0.2  /  DBili  x   /  AST  18  /  ALT  26  /  AlkPhos  95  04-22         Coagulation Studies-   PT/INR - ( 2024 17:57 )   PT: 13.2 sec;   INR: 1.16          PTT - ( 2024 17:57 )  PTT:25.0 sec  Urinalysis Basic - ( 2024 00:48 )    Color: x / Appearance: x / SG: x / pH: x  Gluc: 148 mg/dL / Ketone: x  / Bili: x / Urobili: x   Blood: x / Protein: x / Nitrite: x   Leuk Esterase: x / RBC: x / WBC x   Sq Epi: x / Non Sq Epi: x / Bacteria: x      Endocrine Panel-  Calcium: 8.8 mg/dL ( @ 00:48)  Calcium: 9.9 mg/dL ( @ 17:57)

## 2024-04-23 NOTE — PHYSICAL THERAPY INITIAL EVALUATION ADULT - GAIT DEVIATIONS NOTED, PT EVAL
mild gait unsteadiness, inc lateral sway when ambulating; no overt LOB or episode of knee buckling observed/decreased pily/increased time in double stance/decreased step length/decreased weight-shifting ability

## 2024-04-23 NOTE — PROGRESS NOTE ADULT - SUBJECTIVE AND OBJECTIVE BOX
=================================  NEUROCRITICAL CARE ATTENDING NOTE  =================================    PERLA MAC   MRN-[id number]  Summary:  63y/F with HTN, anxiety, bipolar/schizophrenia, tremors, glaucoma, incidentally found to have pituitary mass while admitted at [facility] for psychosis Feb 2022.Following pituitary lesion dx, patient has been following with endocrinologist Dr. Caesar Thakkar. Endo labs notable for cortisol 23.3. Recently saw neurologist Dr. Hightower for evaluation of pituitary lesion who ordered new MRI for further evaluation which she completed 11/13/23 which showed 2 x 2 x 2 cm right pituitary mass with erosion of the dorsal clivus and extension into the suprasellar cistern. Repeat MRI at 4 months shows increase in size. Patient is now here for endoscopic endonasal resection of pituitary mass.   (22 Apr 2024 12:37)    COURSE IN THE HOSPITAL:  04/22 admitted to [facility], POD0  04/23 POD1 No significant events overnight.     Past Medical History: Pituitary mass Morbid obesity HTN (hypertension) Schizophrenia Glaucoma Schizophrenia, schizoaffective Prediabetes  Allergies:  No Known Allergies  Home meds:   ·	acetaminophen 500 mg oral tablet: 2 tab(s) orally every 4 to 6 hours  ·	amLODIPine 5 mg oral tablet: 1 tab(s) orally once a day  ·	benztropine 1 mg oral tablet: 1 tab(s) orally once a day  ·	carvedilol 25 mg oral tablet: 1 tab(s) orally 2 times a day  ·	docusate 200 mg rectal enema: rectal once a day  ·	latanoprost 0.005% ophthalmic emulsion: 1 drop(s) in each affected eye once a day (at bedtime) both eyes  ·	losartan 25 mg oral tablet: 1 tab(s) orally once a day  ·	Multiple Vitamins oral capsule: 1 cap(s) orally once a day  ·	OLANZapine 10 mg oral tablet: 1 tab(s) orally once a day    PHYSICAL EXAMINATION  T(C): 37.2 (04-23-24 @ 05:19), Max: 38 (04-22-24 @ 20:30) HR: 59 (04-23-24 @ 05:00) (59 - 70) BP: 130/73 (04-23-24 @ 05:00) (114/60 - 162/90) ABP: 117/105 (04-23-24 @ 05:00) (104/97 - 154/67) RR: 18 (04-23-24 @ 05:00) (16 - 20) SpO2: 94% (04-23-24 @ 05:00) (85% - 99%)  NEUROLOGIC EXAMINATION:  Patient is waking up from anesthesia, nonfocal motor exam  GENERAL: not intubated, not in cardiorespiratory distress  EENT:  anicteric  CARDIOVASCULAR: (+) S1 S2, bradycardic rate and regular rhythm  PULMONARY: clear to auscultation bilaterally  ABDOMEN: soft, nontender with normoactive bowel sounds  EXTREMITIES: no edema  SKIN: no rash    LABS: 04-23  CAPILLARY BLOOD GLUCOSE 89    (6.62)  13.1 (13.1)  14.54 )-----------( 247      ( 23 Apr 2024 00:48 )             39.7   (140)  132<L>  |  100  |  9   ----------------------------<  148<H>  3.9   |  22  |  0.61    Ca    8.8      23 Apr 2024 00:48  Phos  2.9     04-23  Mg     1.9     04-23    TPro  6.4  /  Alb  4.0  /  TBili  0.2  /  DBili  x   /  AST  18  /  ALT  26  /  AlkPhos  95  04-22 04-22 @ 07:01  -  04-23 @ 07:00  --------------------------------------------------------  IN: 2109 mL / OUT: 2000 mL / NET: 109 mL    Bacteriology:  CSF studies:  EEG:  Neuroimaging:  Other imaging:    MEDICATIONS: 04-23    ·	cefTRIAXone   IVPB 2000 IV Intermittent every 24 hours  ·	acetaminophen     Tablet .. 1000 Oral every 8 hours  ·	benztropine 1 Oral daily  ·	OLANZapine 10 Oral daily  ·	ondansetron   Disintegrating Tablet 4 Oral every 8 hours  ·	amLODIPine   Tablet 5 milliGRAM(s) Oral daily  ·	carvedilol 25 milliGRAM(s) Oral every 12 hours  ·	losartan 25 milliGRAM(s) Oral daily  ·	polyethylene glycol 3350 17 Oral daily  ·	senna 2 Oral at bedtime  ·	latanoprost 0.005% Ophthalmic Solution 1 Both EYES at bedtime  ·	ondansetron Injectable 4 IV Push every 6 hours PRN  ·	oxyCODONE    IR 5 Oral every 4 hours PRN  ·	oxyCODONE    IR 10 Oral every 4 hours PRN    IV FLUIDS:   DRIPS:  DIET:  Lines:  Drains:    Wounds:    CODE STATUS:  Full Code                       GOALS OF CARE:  aggressive                      DISPOSITION:  ICU =================================  NEUROCRITICAL CARE ATTENDING NOTE  =================================    PERLA MAC   MRN-[id number]  Summary:  63y/F with HTN, anxiety, bipolar/schizophrenia, tremors, glaucoma, incidentally found to have pituitary mass while admitted at [facility] for psychosis Feb 2022.Following pituitary lesion dx, patient has been following with endocrinologist Dr. Caesar Thakkar. Endo labs notable for cortisol 23.3. Recently saw neurologist Dr. Hightower for evaluation of pituitary lesion who ordered new MRI for further evaluation which she completed 11/13/23 which showed 2 x 2 x 2 cm right pituitary mass with erosion of the dorsal clivus and extension into the suprasellar cistern. Repeat MRI at 4 months shows increase in size. Patient is now here for endoscopic endonasal resection of pituitary mass.   (22 Apr 2024 12:37)    COURSE IN THE HOSPITAL:  04/22 admitted to [facility], POD0  04/23 POD1 No significant events overnight.     Past Medical History: Pituitary mass Morbid obesity HTN (hypertension) Schizophrenia Glaucoma Schizophrenia, schizoaffective Prediabetes  Allergies:  No Known Allergies  Home meds:   ·	acetaminophen 500 mg oral tablet: 2 tab(s) orally every 4 to 6 hours  ·	amLODIPine 5 mg oral tablet: 1 tab(s) orally once a day  ·	benztropine 1 mg oral tablet: 1 tab(s) orally once a day  ·	carvedilol 25 mg oral tablet: 1 tab(s) orally 2 times a day  ·	docusate 200 mg rectal enema: rectal once a day  ·	latanoprost 0.005% ophthalmic emulsion: 1 drop(s) in each affected eye once a day (at bedtime) both eyes  ·	losartan 25 mg oral tablet: 1 tab(s) orally once a day  ·	Multiple Vitamins oral capsule: 1 cap(s) orally once a day  ·	OLANZapine 10 mg oral tablet: 1 tab(s) orally once a day    PHYSICAL EXAMINATION  T(C): 37.2 (04-23-24 @ 05:19), Max: 38 (04-22-24 @ 20:30) HR: 59 (04-23-24 @ 05:00) (59 - 70) BP: 130/73 (04-23-24 @ 05:00) (114/60 - 162/90) ABP: 117/105 (04-23-24 @ 05:00) (104/97 - 154/67) RR: 18 (04-23-24 @ 05:00) (16 - 20) SpO2: 94% (04-23-24 @ 05:00) (85% - 99%)  NEUROLOGIC EXAMINATION:  Patient is awake alert fully oriented, moving all 4s, no visual field cuts  GENERAL: not intubated, not in cardiorespiratory distress  EENT:  anicteric; Peña splints bilateral  CARDIOVASCULAR: (+) S1 S2, bradycardic rate and regular rhythm  PULMONARY: clear to auscultation bilaterally  ABDOMEN: soft, nontender with normoactive bowel sounds  EXTREMITIES: no edema  SKIN: no rash    LABS: 04-23  CAPILLARY BLOOD GLUCOSE 89    (6.62)  13.1 (13.1)  14.54 )-----------( 247      ( 23 Apr 2024 00:48 )             39.7   (140)  132<L>  |  100  |  9   ----------------------------<  148<H>  3.9   |  22  |  0.61    Ca    8.8      23 Apr 2024 00:48  Phos  2.9     04-23  Mg     1.9     04-23    TPro  6.4  /  Alb  4.0  /  TBili  0.2  /  DBili  x   /  AST  18  /  ALT  26  /  AlkPhos  95  04-22 04-22 @ 07:01  -  04-23 @ 07:00  --------------------------------------------------------  IN: 2109 mL / OUT: 2000 mL / NET: 109 mL    Bacteriology:  CSF studies:  EEG:  Neuroimaging:  Other imaging:    MEDICATIONS: 04-23    ·	cefTRIAXone   IVPB 2000 IV Intermittent every 24 hours  ·	acetaminophen     Tablet .. 1000 Oral every 8 hours  ·	benztropine 1 Oral daily  ·	OLANZapine 10 Oral daily  ·	ondansetron   Disintegrating Tablet 4 Oral every 8 hours  ·	amLODIPine   Tablet 5 milliGRAM(s) Oral daily  ·	carvedilol 25 milliGRAM(s) Oral every 12 hours  ·	losartan 25 milliGRAM(s) Oral daily  ·	polyethylene glycol 3350 17 Oral daily  ·	senna 2 Oral at bedtime  ·	latanoprost 0.005% Ophthalmic Solution 1 Both EYES at bedtime  ·	ondansetron Injectable 4 IV Push every 6 hours PRN  ·	oxyCODONE    IR 5 Oral every 4 hours PRN  ·	oxyCODONE    IR 10 Oral every 4 hours PRN    IV FLUIDS:  IVL  DRIPS:  DIET: regular diet  Lines: [place]  Drains:    Wounds:    CODE STATUS:  Full Code                       GOALS OF CARE:  aggressive                      DISPOSITION:  ICU

## 2024-04-23 NOTE — PHYSICAL THERAPY INITIAL EVALUATION ADULT - MODALITIES TREATMENT COMMENTS
CN Assessment: CN II: Visual fields full to confrontation; CN III/IV/VI: Extraocular movements intact to all planes with smooth movement and no nystagmus observed, convergence/divergence intact; CN V: Facial sensation intact and symmetric to light touch throughout; CN VII: Facial movements intact and symmetric throughout, no facial droop observed; CN VIII: Hearing intact to finger rub b/l; CN XI: Cervical rotation WNL b/l, Shoulder shrug 5/5 b/l; CN XII: Tongue protrudes to midline. Coordination Assessment: Finger-to-chin: Intact b/l; Finger opposition: Intact b/l

## 2024-04-23 NOTE — PHYSICAL THERAPY INITIAL EVALUATION ADULT - PERTINENT HX OF CURRENT PROBLEM, REHAB EVAL
64 y/o female with PMHx of HTN, anxiety, bipolar/schizophrenia, tremors, glaucoma, incidentally found to have pituitary mass while admitted at [facility] for psychosis Feb 2022.Following pituitary lesion dx, patient has been following with endocrinologist Dr. Caesar Thakkar. Endo labs notable for cortisol 23.3. Recently saw neurologist Dr. Hightower for evaluation of pituitary lesion who ordered new MRI for further evaluation which she completed 11/13/23 which showed 2 x 2 x 2 cm right pituitary mass with erosion of the dorsal clivus and extension into the suprasellar cistern. Repeat MRI at 4 months shows increase in size. Patient is now here for endoscopic endonasal resection of pituitary mass.

## 2024-04-23 NOTE — PROGRESS NOTE ADULT - ASSESSMENT
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  PLAN:   lopez splints will remain in place for a total of 2 weeks ( to be removed outpatient )   - Start nasal saline sprays today QID   - - skullbase precautions: no nose blowing, sneeze with mouth open, no drinking out of a straw, no straining    Please have patient on CTX while in house and DC on augmentin       [facility] at [phone] with any questions/concerns.    Lyssa Matthews PA-C  04-23-24 @ 07:36

## 2024-04-23 NOTE — PHYSICAL THERAPY INITIAL EVALUATION ADULT - ADDITIONAL COMMENTS
Pt lives with her son and daughter in an elevator access apartment building with [address]. She reports independence with all ADLs and functional mobility at baseline. The Pt ambulates without use of AD at baseline. The Pt wears glasses for both reading and distance. She is right hand dominant.

## 2024-04-23 NOTE — OCCUPATIONAL THERAPY INITIAL EVALUATION ADULT - PERTINENT HX OF CURRENT PROBLEM, REHAB EVAL
62 y/o female with PMHx of HTN, anxiety, bipolar/schizophrenia, tremors, glaucoma, incidentally found to have pituitary mass while admitted at [facility] for psychosis Feb 2022.Following pituitary lesion dx, patient has been following with endocrinologist Dr. Caesar Thakkar. Endo labs notable for cortisol 23.3. Recently saw neurologist Dr. Hightower for evaluation of pituitary lesion who ordered new MRI for further evaluation which she completed 11/13/23 which showed 2 x 2 x 2 cm right pituitary mass with erosion of the dorsal clivus and extension into the suprasellar cistern. Repeat MRI at 4 months shows increase in size. Patient is now here for endoscopic endonasal resection of pituitary mass.

## 2024-04-23 NOTE — CONSULT NOTE ADULT - SUBJECTIVE AND OBJECTIVE BOX
HISTORY OF PRESENT ILLNESS  PERLA MAC is a 63y Female with a past medical history of HTN, bioplar disorder, schizophrenia, pituitary microadenoma is now s/p endoscopic transphenoidal or transnasal resection of pituitary tumor on 4/22.  The pituitary adenoma was discovered at [facility] during the work up of psychosis.    Pt is known to Dr. Quintanilla form our department,  MRI from 10/2023 revealed 2 x 2 x 2 cm right pituitary mass associated with erosion of the dorsal clivus and extension into the suprasellar cistern.  MRI from 03/24 showed increase in size of the lesion, measuring 1.9 x 2.2 x 2.1 cm and previously measured approximately 1.9 x 2.0 x 1.9 cm. This lesion again appears to abut the optic chiasm and the adjacent optic chiasm appears unremarkable.    From 06/2023 labs showed total cholesterol 240, , HDL 57, , TSH 1.35, Free T4 1.1, ACTH 24, prolactin 20.8, cortisol AM 23.3    Endocrinology has been consulted for the suspected adrenal insufficiency.       Postoperatively labs revealed  (pre-op 140), glucose 148    CURRENT MEDICATIONS  acetaminophen     Tablet .. 1000 milliGRAM(s) Oral every 8 hours  amLODIPine   Tablet 5 milliGRAM(s) Oral daily  benztropine 1 milliGRAM(s) Oral daily  carvedilol 25 milliGRAM(s) Oral every 12 hours  cefTRIAXone   IVPB 2000 milliGRAM(s) IV Intermittent every 12 hours  chlorhexidine 2% Cloths 1 Application(s) Topical <User Schedule>  enoxaparin Injectable 40 milliGRAM(s) SubCutaneous <User Schedule>  latanoprost 0.005% Ophthalmic Solution 1 Drop(s) Both EYES at bedtime  losartan 25 milliGRAM(s) Oral daily  OLANZapine 10 milliGRAM(s) Oral daily  ondansetron   Disintegrating Tablet 4 milliGRAM(s) Oral every 8 hours  ondansetron Injectable 4 milliGRAM(s) IV Push every 6 hours PRN  oxyCODONE    IR 5 milliGRAM(s) Oral every 4 hours PRN  oxyCODONE    IR 10 milliGRAM(s) Oral every 4 hours PRN  polyethylene glycol 3350 17 Gram(s) Oral daily  senna 2 Tablet(s) Oral at bedtime      REVIEW OF SYSTEMS  Constitutional:  Negative fever, chills or loss of appetite.  Eyes:  Negative blurry vision or double vision.  Cardiovascular:  Negative for chest pain or palpitations.  Respiratory:  Negative for cough, wheezing, or shortness of breath.   Gastrointestinal:  Negative for nausea, vomiting, diarrhea, constipation, or abdominal pain.  Genitourinary:  Negative frequency, urgency or dysuria.  Neurologic:  No headache, confusion, dizziness, lightheadedness.    PHYSICAL EXAM  Vital Signs Last 24 Hrs  T(C): 37.2 (23 Apr 2024 05:19), Max: 38 (22 Apr 2024 20:30)  T(F): 99 (23 Apr 2024 05:19), Max: 100.4 (22 Apr 2024 20:30)  HR: 65 (23 Apr 2024 08:00) (58 - 70)  BP: 140/91 (23 Apr 2024 08:00) (114/60 - 162/90)  BP(mean): 116 (23 Apr 2024 08:00) (80 - 120)  RR: 19 (23 Apr 2024 08:00) (16 - 20)  SpO2: 96% (23 Apr 2024 08:00) (85% - 99%)    Parameters below as of 23 Apr 2024 08:00  Patient On (Oxygen Delivery Method): room air    Constitutional: Awake, alert, in no acute distress.   HEENT: Normocephalic, atraumatic, LIDIA, no proptosis or lid retraction.   Neck: supple, no acanthosis, no thyromegaly or palpable thyroid nodules.  Respiratory: Lungs clear to ausculation bilaterally.   Cardiovascular: regular rhythm, normal S1 and S2, no audible murmurs.   GI: soft, non-tender, non-distended, bowel sounds present, no masses appreciated.  Extremities: No lower extremity edema, peripheral pulses present.   Skin: no rashes.   Psychiatric: AAO x 3. Normal affect/mood.     LABS  CBC - WBC/HGB/HTC/PLT: 12.19/13.1/39.5/263 (04-23-24)  BMP: Na/K/Cl/Bicarb/BUN/Cr/Gluc: 132/4.4/98/22/10/0.69/152 (04-23-24)  Anion Gap: 12 (04-23-24)  eGFR: 98 (04-23-24)  Calcium: 9.6 (04-23-24)  Phosphorus: 3.9 (04-23-24)  Magnesium: 2.5 (04-23-24)  LFT - Alb/Tprot/Tbili/Dbili/AlkPhos/ALT/AST: 4.0/--/0.2/--/95/26/18 (04-22-24)  PT/aPTT/INR: 13.2/25.0/1.16 (04-22-24)    CBC - WBC/HGB/HTC/PLT: 12.19/13.1/39.5/263 (04-23-24)BMP: Na/K/Cl/Bicarb/BUN/Cr/Gluc: 132/4.4/98/22/10/0.69/152 (04-23-24)  Anion Gap: 12 (04-23-24)  eGFR: 98 (04-23-24)  Calcium: 9.6 (04-23-24)  Phosphorus: 3.9 (04-23-24)  Magnesium: 2.5 (04-23-24)    CAPILLARY BLOOD GLUCOSE & INSULIN RECEIVED  89 mg/dL (04-22 @ 10:58)        04-22-24 @ 07:01  -  04-23-24 @ 07:00  --------------------------------------------------------  IN: 2109 mL / OUT: 2240 mL / NET: -131 mL        ASSESSMENT / RECOMMENDATIONS    PERLA MAC is a 63y Female with a past medical history of HTN, bioplar disorder, schizophrenia, pituitary microadenoma is now s/p endoscopic transphenoidal or transnasal resection of pituitary tumor on 4/22.  The pituitary adenoma was discovered at [facility] during the work up of psychosis.    A1C: 5.4 %  BUN: 10  Creatinine: 0.69  GFR: 98  Weight: 112.2  BMI: 38.7    # pituitary adenoma s/p resection  - monitoring off steroids  - today morning cortisol: 45.690 - likely from stress of the surgery?  - trend daily morning cortisol  - Na 132 - no standing ddavp  - please strict Is/Os - DI should be cautioned when the urine output >150-200ml/hr for more than 2 consecutive hours  - last 12 hours urine output 2.24L  - repeat hormone labs in one month    - Patient can follow up at discharge with [facility] by calling [phone] to make an appointment.      Case discussed with Dr. Abad. Primary team updated.       Roro Patiño  Endocrinology Fellow    Service Pager: [phone]  HISTORY OF PRESENT ILLNESS  PERLA MAC is a 63y Female with a past medical history of HTN, bioplar disorder, schizophrenia, pituitary microadenoma is now s/p endoscopic transphenoidal or transnasal resection of pituitary tumor on 4/22.  The pituitary adenoma was discovered at [facility] during the work up of psychosis.    Pt is known to Dr. Quintanilla form our department,  MRI from 10/2023 revealed 2 x 2 x 2 cm right pituitary mass associated with erosion of the dorsal clivus and extension into the suprasellar cistern.  MRI from 03/24 showed increase in size of the lesion, measuring 1.9 x 2.2 x 2.1 cm and previously measured approximately 1.9 x 2.0 x 1.9 cm. This lesion again appears to abut the optic chiasm and the adjacent optic chiasm appears unremarkable.    Prior to surgery, pt denies headaches, vision loss, weight gain.  She states she lost 30lbs weight.  She endorses severe nocturia and she drinks a lot of water as well that she cannot quantify.  menopaused at 53.  has 2 kids.  Denies skin changes, facial plethora, flushing.  Denies family history of pituitary disease.    From 06/2023 labs showed total cholesterol 240, , HDL 57, , TSH 1.35, Free T4 1.1, ACTH 24, prolactin 20.8, cortisol AM 23.3    Endocrinology has been consulted for the suspected adrenal insufficiency.       Postoperatively labs revealed  (pre-op 140), glucose 148    CURRENT MEDICATIONS  acetaminophen     Tablet .. 1000 milliGRAM(s) Oral every 8 hours  amLODIPine   Tablet 5 milliGRAM(s) Oral daily  benztropine 1 milliGRAM(s) Oral daily  carvedilol 25 milliGRAM(s) Oral every 12 hours  cefTRIAXone   IVPB 2000 milliGRAM(s) IV Intermittent every 12 hours  chlorhexidine 2% Cloths 1 Application(s) Topical <User Schedule>  enoxaparin Injectable 40 milliGRAM(s) SubCutaneous <User Schedule>  latanoprost 0.005% Ophthalmic Solution 1 Drop(s) Both EYES at bedtime  losartan 25 milliGRAM(s) Oral daily  OLANZapine 10 milliGRAM(s) Oral daily  ondansetron   Disintegrating Tablet 4 milliGRAM(s) Oral every 8 hours  ondansetron Injectable 4 milliGRAM(s) IV Push every 6 hours PRN  oxyCODONE    IR 5 milliGRAM(s) Oral every 4 hours PRN  oxyCODONE    IR 10 milliGRAM(s) Oral every 4 hours PRN  polyethylene glycol 3350 17 Gram(s) Oral daily  senna 2 Tablet(s) Oral at bedtime      REVIEW OF SYSTEMS  Constitutional:  Negative fever, chills or loss of appetite.  Eyes:  Negative blurry vision or double vision.  Cardiovascular:  Negative for chest pain or palpitations.  Respiratory:  Negative for cough, wheezing, or shortness of breath.   Gastrointestinal:  Negative for nausea, vomiting, diarrhea, constipation, or abdominal pain.  Genitourinary:  Negative frequency, urgency or dysuria.  Neurologic:  No headache, confusion, dizziness, lightheadedness.    PHYSICAL EXAM  Vital Signs Last 24 Hrs  T(C): 37.2 (23 Apr 2024 05:19), Max: 38 (22 Apr 2024 20:30)  T(F): 99 (23 Apr 2024 05:19), Max: 100.4 (22 Apr 2024 20:30)  HR: 65 (23 Apr 2024 08:00) (58 - 70)  BP: 140/91 (23 Apr 2024 08:00) (114/60 - 162/90)  BP(mean): 116 (23 Apr 2024 08:00) (80 - 120)  RR: 19 (23 Apr 2024 08:00) (16 - 20)  SpO2: 96% (23 Apr 2024 08:00) (85% - 99%)    Parameters below as of 23 Apr 2024 08:00  Patient On (Oxygen Delivery Method): room air    Constitutional: Awake, alert, in no acute distress.   HEENT: Normocephalic, atraumatic, LIDIA, no proptosis or lid retraction.   Neck: supple, no acanthosis, no thyromegaly or palpable thyroid nodules.  Respiratory: Lungs clear to ausculation bilaterally.   Cardiovascular: regular rhythm, normal S1 and S2, no audible murmurs.   GI: soft, non-tender, non-distended, bowel sounds present, no masses appreciated.  Extremities: No lower extremity edema, peripheral pulses present.   Skin: no rashes.   Psychiatric: AAO x 3. Normal affect/mood.     LABS  CBC - WBC/HGB/HTC/PLT: 12.19/13.1/39.5/263 (04-23-24)  BMP: Na/K/Cl/Bicarb/BUN/Cr/Gluc: 132/4.4/98/22/10/0.69/152 (04-23-24)  Anion Gap: 12 (04-23-24)  eGFR: 98 (04-23-24)  Calcium: 9.6 (04-23-24)  Phosphorus: 3.9 (04-23-24)  Magnesium: 2.5 (04-23-24)  LFT - Alb/Tprot/Tbili/Dbili/AlkPhos/ALT/AST: 4.0/--/0.2/--/95/26/18 (04-22-24)  PT/aPTT/INR: 13.2/25.0/1.16 (04-22-24)    CBC - WBC/HGB/HTC/PLT: 12.19/13.1/39.5/263 (04-23-24)BMP: Na/K/Cl/Bicarb/BUN/Cr/Gluc: 132/4.4/98/22/10/0.69/152 (04-23-24)  Anion Gap: 12 (04-23-24)  eGFR: 98 (04-23-24)  Calcium: 9.6 (04-23-24)  Phosphorus: 3.9 (04-23-24)  Magnesium: 2.5 (04-23-24)    CAPILLARY BLOOD GLUCOSE & INSULIN RECEIVED  89 mg/dL (04-22 @ 10:58)        04-22-24 @ 07:01  -  04-23-24 @ 07:00  --------------------------------------------------------  IN: 2109 mL / OUT: 2240 mL / NET: -131 mL        ASSESSMENT / RECOMMENDATIONS    PERLA MAC is a 63y Female with a past medical history of HTN, bioplar disorder, schizophrenia, pituitary microadenoma is now s/p endoscopic transphenoidal or transnasal resection of pituitary tumor on 4/22.  The pituitary adenoma was discovered at [facility] during the work up of psychosis.    A1C: 5.4 %  BUN: 10  Creatinine: 0.69  GFR: 98  Weight: 112.2  BMI: 38.7    # pituitary adenoma s/p resection  - monitoring off steroids  - today morning cortisol: 45.690 - likely from stress of the surgery?  - trend daily morning cortisol  - Na 132 - no standing ddavp  - please strict Is/Os - DI should be cautioned when the urine output >150-200ml/hr for more than 2 consecutive hours  - last 12 hours urine output 2.24L  - repeat hormone labs in one month    - Patient can follow up at discharge with [facility] by calling [phone] to make an appointment.      Case discussed with Dr. Abad. Primary team updated.       Roro Patiño  Endocrinology Fellow    Service Pager: [phone]  HISTORY OF PRESENT ILLNESS  PERLA MAC is a 63y Female with a past medical history of HTN, bioplar disorder, schizophrenia, pituitary macrooadenoma is now s/p endoscopic transphenoidal or transnasal resection of pituitary tumor on 4/22.  The pituitary adenoma was discovered at [facility] during the work up of psychosis.    Pt is known to Dr. Quintanilla form our department,  MRI from 10/2023 revealed 2 x 2 x 2 cm right pituitary mass associated with erosion of the dorsal clivus and extension into the suprasellar cistern.  MRI from 03/24 showed increase in size of the lesion, measuring 1.9 x 2.2 x 2.1 cm and previously measured approximately 1.9 x 2.0 x 1.9 cm. This lesion again appears to abut the optic chiasm and the adjacent optic chiasm appears unremarkable.  Due to increase in size, decision was made for pt to undergo surgery    Prior to surgery, pt denies headaches, vision loss, weight gain.  She states she lost 30lbs weight.  She endorses severe nocturia and she drinks a lot of water as well that she cannot quantify.  menopaused at 53.  has 2 kids.  Denies skin changes, facial plethora, flushing.  Denies family history of pituitary disease.    From 06/2023 labs showed total cholesterol 240, , HDL 57, , TSH 1.35, Free T4 1.1, ACTH 24, prolactin 20.8, cortisol AM 23.3      Postoperatively labs revealed  (pre-op 140), glucose 148    CURRENT MEDICATIONS  acetaminophen     Tablet .. 1000 milliGRAM(s) Oral every 8 hours  amLODIPine   Tablet 5 milliGRAM(s) Oral daily  benztropine 1 milliGRAM(s) Oral daily  carvedilol 25 milliGRAM(s) Oral every 12 hours  cefTRIAXone   IVPB 2000 milliGRAM(s) IV Intermittent every 12 hours  chlorhexidine 2% Cloths 1 Application(s) Topical <User Schedule>  enoxaparin Injectable 40 milliGRAM(s) SubCutaneous <User Schedule>  latanoprost 0.005% Ophthalmic Solution 1 Drop(s) Both EYES at bedtime  losartan 25 milliGRAM(s) Oral daily  OLANZapine 10 milliGRAM(s) Oral daily  ondansetron   Disintegrating Tablet 4 milliGRAM(s) Oral every 8 hours  ondansetron Injectable 4 milliGRAM(s) IV Push every 6 hours PRN  oxyCODONE    IR 5 milliGRAM(s) Oral every 4 hours PRN  oxyCODONE    IR 10 milliGRAM(s) Oral every 4 hours PRN  polyethylene glycol 3350 17 Gram(s) Oral daily  senna 2 Tablet(s) Oral at bedtime      REVIEW OF SYSTEMS  Constitutional:  Negative fever, chills or loss of appetite.  Eyes:  Negative blurry vision or double vision.  Cardiovascular:  Negative for chest pain or palpitations.  Respiratory:  Negative for cough, wheezing, or shortness of breath.   Gastrointestinal:  Negative for nausea, vomiting, diarrhea, constipation, or abdominal pain.  Genitourinary:  Negative frequency, urgency or dysuria.  Neurologic:  No headache, confusion, dizziness, lightheadedness.    PHYSICAL EXAM  Vital Signs Last 24 Hrs  T(C): 37.2 (23 Apr 2024 05:19), Max: 38 (22 Apr 2024 20:30)  T(F): 99 (23 Apr 2024 05:19), Max: 100.4 (22 Apr 2024 20:30)  HR: 65 (23 Apr 2024 08:00) (58 - 70)  BP: 140/91 (23 Apr 2024 08:00) (114/60 - 162/90)  BP(mean): 116 (23 Apr 2024 08:00) (80 - 120)  RR: 19 (23 Apr 2024 08:00) (16 - 20)  SpO2: 96% (23 Apr 2024 08:00) (85% - 99%)    Parameters below as of 23 Apr 2024 08:00  Patient On (Oxygen Delivery Method): room air    Constitutional: Awake, alert, in no acute distress.   HEENT: Normocephalic, atraumatic, LIDIA, no proptosis or lid retraction.  (+lips smacking)  Neck: supple, no acanthosis, no thyromegaly or palpable thyroid nodules.  Respiratory: Lungs clear to ausculation bilaterally.   Cardiovascular: regular rhythm, normal S1 and S2, no audible murmurs.   GI: soft, non-tender, non-distended, bowel sounds present, no masses appreciated.  Extremities: No lower extremity edema, peripheral pulses present.     LABS  CBC - WBC/HGB/HTC/PLT: 12.19/13.1/39.5/263 (04-23-24)  BMP: Na/K/Cl/Bicarb/BUN/Cr/Gluc: 132/4.4/98/22/10/0.69/152 (04-23-24)  Anion Gap: 12 (04-23-24)  eGFR: 98 (04-23-24)  Calcium: 9.6 (04-23-24)  Phosphorus: 3.9 (04-23-24)  Magnesium: 2.5 (04-23-24)  LFT - Alb/Tprot/Tbili/Dbili/AlkPhos/ALT/AST: 4.0/--/0.2/--/95/26/18 (04-22-24)  PT/aPTT/INR: 13.2/25.0/1.16 (04-22-24)    CBC - WBC/HGB/HTC/PLT: 12.19/13.1/39.5/263 (04-23-24)BMP: Na/K/Cl/Bicarb/BUN/Cr/Gluc: 132/4.4/98/22/10/0.69/152 (04-23-24)  Anion Gap: 12 (04-23-24)  eGFR: 98 (04-23-24)  Calcium: 9.6 (04-23-24)  Phosphorus: 3.9 (04-23-24)  Magnesium: 2.5 (04-23-24)    CAPILLARY BLOOD GLUCOSE & INSULIN RECEIVED  89 mg/dL (04-22 @ 10:58)        04-22-24 @ 07:01  -  04-23-24 @ 07:00  --------------------------------------------------------  IN: 2109 mL / OUT: 2240 mL / NET: -131 mL        ASSESSMENT / RECOMMENDATIONS    PERLA MAC is a 63y Female with a past medical history of HTN, bioplar disorder, schizophrenia, pituitary macroadenoma is now s/p endoscopic transphenoidal or transnasal resection of pituitary tumor on 4/22.   A1C: 5.4 %  BUN: 10  Creatinine: 0.69  GFR: 98  Weight: 112.2  BMI: 38.7    # pituitary adenoma s/p resection  - monitoring off steroids  - today morning cortisol: 45.690 - likely from stress of the surgery?  - trend daily morning cortisol  - please strict Is/Os - DI should be cautioned when the urine output >150-200ml/hr for more than 2 consecutive hours  - last 12 hours urine output 2.24L  - pt rather hyponatremic at Na 132, likely from excessively drinking water post-op.  Counselled patient to only drink water to thirst  - monitor BMP Q8, check UA, Uosm, Leonardo, Urine gravity  - repeat hormone labs in one month    - Patient can follow up at discharge with [facility] by calling [phone] to make an appointment.      Case discussed with Dr. Abad. Primary team updated.       Roro Patiño  Endocrinology Fellow    Service Pager: [phone]

## 2024-04-23 NOTE — OCCUPATIONAL THERAPY INITIAL EVALUATION ADULT - ADDITIONAL COMMENTS
PTA, pt reporting that she was able to perform all ADLs/mobility independently and w/o use of AD/AE. Pt reporting 1 fall in the past 6 weeks and 2 falls in the past year. Pt is R hand dominant and wears glasses for reading/distance.

## 2024-04-23 NOTE — PHYSICAL THERAPY INITIAL EVALUATION ADULT - ASSISTIVE DEVICE FOR TRANSFER: SIT/STAND, REHAB EVAL
08/01/23 2054   Patient Assessment/Suction   Level of Consciousness (AVPU) alert   Respiratory Effort Normal;Unlabored   Expansion/Accessory Muscles/Retractions no retractions;no use of accessory muscles   All Lung Fields Breath Sounds Anterior:;Posterior:;Lateral:;equal bilaterally;clear   Rhythm/Pattern, Respiratory depth regular;pattern regular;unlabored;no shortness of breath reported   Skin Integrity   $ Wound Care Tech Time 15 min   Area Observed Right;Behind ear;Left;Cheek;Bridge of nose;Lower lip;Nares   Skin Appearance without discoloration   PRE-TX-O2   Device (Oxygen Therapy) nasal cannula   $ Is the patient on Low Flow Oxygen? Yes   Flow (L/min) 4   SpO2 96 %   Pulse Oximetry Type Intermittent   $ Pulse Oximetry - Multiple Charge Pulse Oximetry - Multiple   Pulse 101   Resp 20   Aerosol Therapy   $ Aerosol Therapy Charges Aerosol Treatment  (bud)   Daily Review of Necessity (SVN) completed   Respiratory Treatment Status (SVN) given   Treatment Route (SVN) mask;oxygen   Patient Position (SVN) semi-Sanderson's   Post Treatment Assessment (SVN) breath sounds improved   Signs of Intolerance (SVN) none   Breath Sounds Post-Respiratory Treatment   Throughout All Fields Post-Treatment All Fields   Throughout All Fields Post-Treatment aeration increased   Post-treatment Heart Rate (beats/min) 104   Post-treatment Resp Rate (breaths/min) 20        HHA x 2

## 2024-04-23 NOTE — OCCUPATIONAL THERAPY INITIAL EVALUATION ADULT - DIAGNOSIS, OT EVAL
Pt p/w impaired balance and decreased functional activity tolerance, impacting ability to perform functional mobility/ADLs.

## 2024-04-23 NOTE — PROGRESS NOTE ADULT - ASSESSMENT
63y/F with  incidental pituitary mass  Hypertension, prediabetes  schizophrenia,   morbid obesity  glaucoma    PLAN:   NEURO: neurochecks q1h, PRN pain meds with acetaminophen / opiates  MRI on stepdown, f/u final histopathology, steroids as per endo  DI watch, CSF leak watch  REHAB:  physical therapy evaluation and management    EARLY MOB:  HOB up    PULM:  O2 as needed to keep sats >92%, skull base precautions, no incentive spirometry  CARDIO:  SBP goal 100-140mm Hg; cardene drip to SBP goal; restart home antihypertensives  ENDO:  Blood sugar goals 140-180 mg/dL, continue insulin sliding scale; daily cortisol levels  GI:  bowel regimen  DIET: advance diet as tolerated once more awake  RENAL:  IVL, d/c Shrestha, if UO>250cc/hr x 2 hours, send DI labs  HEM/ONC: check post-op Hb  VTE Prophylaxis: SCDs, no DVT chemoprophylaxis (fresh post-op)  ID: afebrile, no leukocytosis; ceftriaxone / amox/clav as per ENT  Social: will update family    Active issues:  What's keeping patient in the ICU? fresh post-op  What is this patient's dispo plan? stepdown tomorrow if stable    ATTENDING ATTESTATION:  I was physically present for the key portions of the evaluation and management (E/M) service provided.  I agree with the above history, physical and plan, which I have reviewed and edited where appropriate.    Patient at high risk for neurological deterioration or death due to:  ICU delirium, aspiration PNA, DVT / PE.  Critical care time:  I have personally provided 60 minutes of critical care time, excluding time spent on separate procedures.      Plan discussed with RN, house staff. 63y/F with  incidental pituitary mass  Hypertension, prediabetes  schizophrenia,   morbid obesity  glaucoma    PLAN:   NEURO: neurochecks q2h, PRN pain meds with acetaminophen / opiates  MRI on stepdown, f/u final histopathology, steroids as per endo  DI watch, CSF leak watch  continue olanzapine  REHAB:  physical therapy evaluation and management    EARLY MOB:  HOB up    PULM:  room airm skull base precautions, no incentive spirometry  CARDIO:  SBP goal 100-140mm Hg; cardene drip to SBP goal; continue home antihypertensives  ENDO:  Blood sugar goals 140-180 mg/dL, continue insulin sliding scale; daily cortisol levels  GI:  bowel regimen  DIET: regular diet  RENAL:  IVL, d/c Shrestha, if UO>250cc/hr x 2 hours, send DI labs; BMP this afternoon  HEM/ONC: Hb stable  VTE Prophylaxis: SCDs, SQL if ENT ok  ID: afebrile, leukocytosis; ceftriaxone / amox/clav as per ENT  Social: will update family    Active issues:  What's keeping patient in the ICU? fresh post-op  What is this patient's dispo plan? stepdown tomorrow if stable    ATTENDING ATTESTATION:  I was physically present for the key portions of the evaluation and management (E/M) service provided.  I agree with the above history, physical and plan, which I have reviewed and edited where appropriate.    Patient at high risk for neurological deterioration or death due to:  ICU delirium, aspiration PNA, DVT / PE.  Critical care time:  I have personally provided 60 minutes of critical care time, excluding time spent on separate procedures.      ICU stepdown Checklist:    Completed: 04-23 @ 09:00    [X] hemodynamically stable – VS WNL and stable x 24hours, UO adequate  [n/a ] if  previously on HDA - off pressors x 24h with stable neuro exam    [X] no new symptoms x 24h (i.e. new fever, new-onset nausea/vomiting) - nose bleed minimal  [X] stable labs: (i.e. WBC not rising, sodium not dropping) - Sodium dropping - will recheck this aftenroon   [X] patient not at high risk for aspiration, if high risk then:                  [ ] should have definitive plans for trach/PEG (alternative option is to discharge from ICU to facilty)                  [ ] stepdown to bed close to nurse’s station  [n/a] low suctioning requirements (i.e. q4h or less)  [X] sign-off from primary RN* jannette  [X] drains do not require ICU level of care  [X] if patient previously agitated or with behavioral issues – controlled   [X] pain controlled      Plan discussed with RN, house staff.

## 2024-04-23 NOTE — CONSULT NOTE ADULT - ATTENDING COMMENTS
Pt seen on rounds this afternoon.  63-yo woman with history of bipolar disease and schizophrenia now 1 day s/p trans-sphenoidal resection of a pituitary macroadenoma.  Lesion was found on a CT scan done at [facility] during an evaluation for psychosis.  Lesion was approximately 2 cm in largest dimension when discovered, had increased slightly in size over the past several months.    Pt denied any visual disturbances.  Also had no symptoms suggestive of hormonal secretion by the tumor.  Hormonal studies suggested normal pituitary function.  She having had long intervals of oligomenorrhea during her reproductive years, may have been receiving Provera injections to induce endometrial shedding.  Menopause occurred at age 53, but she did not experience any vasomotor symptoms  On exam she does not show any features of Cushings or acromegaly.  Visual fields appear to be intact to confrontational testing.  She appears to have an alternating exotropia.  Serum Na fell to 132 this morning.  No urine studies were done, but the pt reported drinking large amounts of water post-op due to what she described as "cotton mouth."  Denied thirst at the time of our visit.  Repeat BMP to be done, as well as urine studies.  Doubt that she has true SIADH but this (as well as DI) need to be watched for  She was not started on hydrocortisone post-op, and serum cortisol level was 45 mcg/dl this morning.    --Await results of BMP, urine studies  --Continue checking AM cortisol levels daily for the next few days

## 2024-04-23 NOTE — PHYSICAL THERAPY INITIAL EVALUATION ADULT - IMPAIRMENTS FOUND, PT EVAL
aerobic capacity/endurance/anthropometric characteristics/gait, locomotion, and balance/muscle strength/posture/ROM

## 2024-04-23 NOTE — OCCUPATIONAL THERAPY INITIAL EVALUATION ADULT - LEVEL OF INDEPENDENCE: DRESS LOWER BODY, OT EVAL
don B socks while sitting in figure 4 position, requiring CGAx1 2/2 impaired functional reach./contact guard

## 2024-04-23 NOTE — PHYSICAL THERAPY INITIAL EVALUATION ADULT - DIAGNOSIS, PT EVAL
[facility] [address]  coding opportunities      [facility] [address]  coding opportunities             Chart reviewed, (number of) suggestions sent to provider: 1           Patients insurance company: [employer] (Medicare Advantage and archify)     Visit status: Patient arrived for their scheduled appointment     Provider never responded to [address]  coding request       [facility] [address]  coding opportunities             Chart reviewed, (number of) suggestions sent to provider: 1           Patients insurance company: [employer] (Medicare Advantage and Commercial)     Visit status: Patient arrived for their scheduled appointment   dx on bill: F33 1-depression specified-impacting dx code  Provider never responded to [address]  coding request              Chart reviewed, (number of) suggestions sent to provider: 1      DX:   It is noted in the patient record that the patient has F32 9 depression, single episode, unspecified   On lexapro  Can the depression be further specified as:     F32 0 major depressive disorder, single episode, mild  OR   F32 1 major depressive disorder, single episode, moderate  OR   F32 2 major depressive disorder, single episode, severe without psychotic features OR   F32 4 major depressive disorder, single episode, in partial remission OR   F32 5 major depressive disorder, single episode, in full remission       Patients insurance company: [employer] (Medicare Advantage and archify)
Impaired Motor Function and Sensory Integrity Associated with Nonprogressive Disorders of the Central Nervous System-Acquired in Adolescense or Adulthood

## 2024-04-24 ENCOUNTER — TRANSCRIPTION ENCOUNTER (OUTPATIENT)
Age: 64
End: 2024-04-24

## 2024-04-24 PROBLEM — R73.03 PREDIABETES: Chronic | Status: ACTIVE | Noted: 2024-04-19

## 2024-04-24 PROBLEM — F25.9 SCHIZOAFFECTIVE DISORDER, UNSPECIFIED: Chronic | Status: ACTIVE | Noted: 2024-04-19

## 2024-04-24 PROBLEM — E66.01 MORBID (SEVERE) OBESITY DUE TO EXCESS CALORIES: Chronic | Status: ACTIVE | Noted: 2024-04-19

## 2024-04-24 LAB
ANION GAP SERPL CALC-SCNC: 9 MMOL/L — SIGNIFICANT CHANGE UP (ref 5–17)
BUN SERPL-MCNC: 19 MG/DL — SIGNIFICANT CHANGE UP (ref 7–23)
CALCIUM SERPL-MCNC: 9.6 MG/DL — SIGNIFICANT CHANGE UP (ref 8.4–10.5)
CHLORIDE SERPL-SCNC: 102 MMOL/L — SIGNIFICANT CHANGE UP (ref 96–108)
CO2 SERPL-SCNC: 26 MMOL/L — SIGNIFICANT CHANGE UP (ref 22–31)
CORTIS AM PEAK SERPL-MCNC: 19.72 UG/DL — HIGH (ref 6.02–18.4)
CREAT SERPL-MCNC: 1.01 MG/DL — SIGNIFICANT CHANGE UP (ref 0.5–1.3)
EGFR: 63 ML/MIN/1.73M2 — SIGNIFICANT CHANGE UP
GLUCOSE SERPL-MCNC: 100 MG/DL — HIGH (ref 70–99)
HCT VFR BLD CALC: 37.6 % — SIGNIFICANT CHANGE UP (ref 34.5–45)
HGB BLD-MCNC: 12.4 G/DL — SIGNIFICANT CHANGE UP (ref 11.5–15.5)
MAGNESIUM SERPL-MCNC: 2.4 MG/DL — SIGNIFICANT CHANGE UP (ref 1.6–2.6)
MCHC RBC-ENTMCNC: 29.6 PG — SIGNIFICANT CHANGE UP (ref 27–34)
MCHC RBC-ENTMCNC: 33 GM/DL — SIGNIFICANT CHANGE UP (ref 32–36)
MCV RBC AUTO: 89.7 FL — SIGNIFICANT CHANGE UP (ref 80–100)
NRBC # BLD: 0 /100 WBCS — SIGNIFICANT CHANGE UP (ref 0–0)
PHOSPHATE SERPL-MCNC: 3.8 MG/DL — SIGNIFICANT CHANGE UP (ref 2.5–4.5)
PLATELET # BLD AUTO: 241 K/UL — SIGNIFICANT CHANGE UP (ref 150–400)
POTASSIUM SERPL-MCNC: 4 MMOL/L — SIGNIFICANT CHANGE UP (ref 3.5–5.3)
POTASSIUM SERPL-SCNC: 4 MMOL/L — SIGNIFICANT CHANGE UP (ref 3.5–5.3)
RBC # BLD: 4.19 M/UL — SIGNIFICANT CHANGE UP (ref 3.8–5.2)
RBC # FLD: 15.1 % — HIGH (ref 10.3–14.5)
SODIUM SERPL-SCNC: 137 MMOL/L — SIGNIFICANT CHANGE UP (ref 135–145)
WBC # BLD: 8.52 K/UL — SIGNIFICANT CHANGE UP (ref 3.8–10.5)
WBC # FLD AUTO: 8.52 K/UL — SIGNIFICANT CHANGE UP (ref 3.8–10.5)

## 2024-04-24 PROCEDURE — 99232 SBSQ HOSP IP/OBS MODERATE 35: CPT | Mod: GC

## 2024-04-24 PROCEDURE — 70553 MRI BRAIN STEM W/O & W/DYE: CPT | Mod: 26

## 2024-04-24 PROCEDURE — 99222 1ST HOSP IP/OBS MODERATE 55: CPT

## 2024-04-24 PROCEDURE — 99024 POSTOP FOLLOW-UP VISIT: CPT

## 2024-04-24 RX ORDER — POLYETHYLENE GLYCOL 3350 17 G/17G
17 POWDER, FOR SOLUTION ORAL
Refills: 0 | Status: DISCONTINUED | OUTPATIENT
Start: 2024-04-24 | End: 2024-04-25

## 2024-04-24 RX ORDER — ACETAMINOPHEN 500 MG
650 TABLET ORAL EVERY 6 HOURS
Refills: 0 | Status: DISCONTINUED | OUTPATIENT
Start: 2024-04-24 | End: 2024-04-25

## 2024-04-24 RX ORDER — LACTULOSE 10 G/15ML
20 SOLUTION ORAL ONCE
Refills: 0 | Status: COMPLETED | OUTPATIENT
Start: 2024-04-24 | End: 2024-04-24

## 2024-04-24 RX ADMIN — Medication 2 SPRAY(S): at 18:04

## 2024-04-24 RX ADMIN — Medication 1 MILLIGRAM(S): at 10:37

## 2024-04-24 RX ADMIN — LACTULOSE 20 GRAM(S): 10 SOLUTION ORAL at 15:52

## 2024-04-24 RX ADMIN — Medication 2 SPRAY(S): at 21:45

## 2024-04-24 RX ADMIN — Medication 1000 MILLIGRAM(S): at 13:39

## 2024-04-24 RX ADMIN — OXYCODONE HYDROCHLORIDE 10 MILLIGRAM(S): 5 TABLET ORAL at 23:54

## 2024-04-24 RX ADMIN — Medication 650 MILLIGRAM(S): at 21:44

## 2024-04-24 RX ADMIN — ENOXAPARIN SODIUM 40 MILLIGRAM(S): 100 INJECTION SUBCUTANEOUS at 21:44

## 2024-04-24 RX ADMIN — LOSARTAN POTASSIUM 25 MILLIGRAM(S): 100 TABLET, FILM COATED ORAL at 06:20

## 2024-04-24 RX ADMIN — AMLODIPINE BESYLATE 5 MILLIGRAM(S): 2.5 TABLET ORAL at 06:20

## 2024-04-24 RX ADMIN — Medication 1000 MILLIGRAM(S): at 14:26

## 2024-04-24 RX ADMIN — Medication 2 SPRAY(S): at 06:21

## 2024-04-24 RX ADMIN — Medication 2 SPRAY(S): at 10:37

## 2024-04-24 RX ADMIN — ONDANSETRON 4 MILLIGRAM(S): 8 TABLET, FILM COATED ORAL at 06:20

## 2024-04-24 RX ADMIN — POLYETHYLENE GLYCOL 3350 17 GRAM(S): 17 POWDER, FOR SOLUTION ORAL at 15:52

## 2024-04-24 RX ADMIN — Medication 1000 MILLIGRAM(S): at 06:20

## 2024-04-24 RX ADMIN — LATANOPROST 1 DROP(S): 0.05 SOLUTION/ DROPS OPHTHALMIC; TOPICAL at 21:44

## 2024-04-24 RX ADMIN — Medication 2 SPRAY(S): at 02:36

## 2024-04-24 RX ADMIN — CEFTRIAXONE 100 MILLIGRAM(S): 500 INJECTION, POWDER, FOR SOLUTION INTRAMUSCULAR; INTRAVENOUS at 10:37

## 2024-04-24 RX ADMIN — CEFTRIAXONE 100 MILLIGRAM(S): 500 INJECTION, POWDER, FOR SOLUTION INTRAMUSCULAR; INTRAVENOUS at 23:36

## 2024-04-24 RX ADMIN — OLANZAPINE 10 MILLIGRAM(S): 15 TABLET, FILM COATED ORAL at 23:36

## 2024-04-24 RX ADMIN — Medication 2 SPRAY(S): at 13:40

## 2024-04-24 RX ADMIN — Medication 650 MILLIGRAM(S): at 22:44

## 2024-04-24 NOTE — CONSULT NOTE ADULT - SUBJECTIVE AND OBJECTIVE BOX
Patient is a 63y old  Female who presents with a chief complaint of surgery for pituitary mass (2024 07:13)      HPI:  62 y/o female with PMHx of HTN, anxiety, bipolar/schizophrenia, tremors, glaucoma, incidentally found to have pituitary mass while admitted at [facility] for psychosis 2022.Following pituitary lesion dx, patient has been following with endocrinologist Dr. Caesar Thakkar. Endo labs notable for cortisol 23.3. Recently saw neurologist Dr. Hightower for evaluation of pituitary lesion who ordered new MRI for further evaluation which she completed 23 which showed 2 x 2 x 2 cm right pituitary mass with erosion of the dorsal clivus and extension into the suprasellar cistern. Repeat MRI at 4 months shows increase in size. Patient is now here for endoscopic endonasal resection of pituitary mass.    (2024 12:37)    Patient seen and examined at bedside this AM.  Reports feeling well, mild headache pressure.  Having serosanguinous output onto nasal dressing but no purulence.  Denies fever, chills, CP, SOB, N/V, abdominal pain.  Felt bloated yesterday after drinking ginger ale, passing gas but no BM.  With regards to home medications she takes Coreg 25mg BID (recently increased by her cardiologist), Amlodipine 5mg, Losartan 25mg for HTN.  Takes Olanzapine for bipolar with benztropine      REVIEW OF SYSTEMS: see HPI    PAST MEDICAL & SURGICAL HISTORY:  Pituitary mass      Morbid obesity      HTN (hypertension)      Glaucoma      Schizophrenia, schizoaffective      Prediabetes      History of 2  sections      SOCIAL HISTORY:  Tobacco use: Former smoker, quit 3 years ago  EtOH use: Glass of wine on weekends  Recreational drug use: None    MEDICATIONS:  MEDICATIONS  (STANDING):  acetaminophen     Tablet .. 1000 milliGRAM(s) Oral every 8 hours  amLODIPine   Tablet 5 milliGRAM(s) Oral daily  benztropine 1 milliGRAM(s) Oral daily  carvedilol 25 milliGRAM(s) Oral every 12 hours  cefTRIAXone   IVPB 2000 milliGRAM(s) IV Intermittent every 12 hours  enoxaparin Injectable 40 milliGRAM(s) SubCutaneous <User Schedule>  latanoprost 0.005% Ophthalmic Solution 1 Drop(s) Both EYES at bedtime  losartan 25 milliGRAM(s) Oral daily  OLANZapine 10 milliGRAM(s) Oral daily  ondansetron   Disintegrating Tablet 4 milliGRAM(s) Oral every 8 hours  polyethylene glycol 3350 17 Gram(s) Oral daily  senna 2 Tablet(s) Oral at bedtime  sodium chloride 0.65% Nasal 2 Spray(s) Both Nostrils every 4 hours    MEDICATIONS  (PRN):  ondansetron Injectable 4 milliGRAM(s) IV Push every 6 hours PRN Nausea and/or Vomiting  oxyCODONE    IR 5 milliGRAM(s) Oral every 4 hours PRN Moderate Pain (4 - 6)  oxyCODONE    IR 10 milliGRAM(s) Oral every 4 hours PRN Severe Pain (7 - 10)      ALLERGIES:  Allergies    No Known Allergies    Intolerances        VITAL SIGNS:  Vital Signs Last 24 Hrs  T(C): 36.5 (2024 08:20), Max: 37.2 (2024 17:49)  T(F): 97.7 (2024 08:20), Max: 99 (2024 17:49)  HR: 59 (2024 08:20) (58 - 71)  BP: 142/88 (2024 08:20) (116/70 - 155/89)  BP(mean): 85 (2024 14:45) (85 - 114)  RR: 18 (2024 08:20) (17 - 20)  SpO2: 95% (2024 08:20) (94% - 98%)    Parameters below as of 2024 08:20  Patient On (Oxygen Delivery Method): room air        24 @ 07:01  -  24 @ 07:00  --------------------------------------------------------  IN:    IV PiggyBack: 50 mL    Oral Fluid: 550 mL  Total IN: 600 mL    OUT:    Voided (mL): 1600 mL  Total OUT: 1600 mL    Total NET: -1000 mL      24 @ 07:01  -  24 @ 13:24  --------------------------------------------------------  IN:    Oral Fluid: 600 mL  Total IN: 600 mL    OUT:    Voided (mL): 800 mL  Total OUT: 800 mL    Total NET: -200 mL          PHYSICAL EXAM:  Constitutional: NAD, comfortable in bed.  HEENT: NC/AT, PERRLA, EOMI, MMM mustache dressing with some serosanguinous drainage  Neck: Supple  Respiratory: CTA B/L. No w/r/r.   Cardiovascular: RRR, normal S1 and S2, no m/r/g.   Gastrointestinal: +BS, soft NTND  Extremities: wwp; no cyanosis, clubbing or edema.   Neurological: AAOx3, strength and sensation intact throughout.   Skin: No gross skin abnormalities or rashes        LABS:                        12.4   8.52  )-----------( 241      ( 2024 05:30 )             37.6     04-24    137  |  102  |  19  ----------------------------<  100<H>  4.0   |  26  |  1.01    Ca    9.6      2024 05:30  Phos  3.8     04-24  Mg     2.4     04-24    TPro  6.4  /  Alb  4.0  /  TBili  0.2  /  DBili  x   /  AST  18  /  ALT  26  /  AlkPhos  95  04-22    PT/INR - ( 2024 17:57 )   PT: 13.2 sec;   INR: 1.16          PTT - ( 2024 17:57 )  PTT:25.0 sec  Urinalysis Basic - ( 2024 05:30 )    Color: x / Appearance: x / SG: x / pH: x  Gluc: 100 mg/dL / Ketone: x  / Bili: x / Urobili: x   Blood: x / Protein: x / Nitrite: x   Leuk Esterase: x / RBC: x / WBC x   Sq Epi: x / Non Sq Epi: x / Bacteria: x          CAPILLARY BLOOD GLUCOSE              RADIOLOGY & ADDITIONAL TESTS: Reviewed.

## 2024-04-24 NOTE — PROGRESS NOTE ADULT - SUBJECTIVE AND OBJECTIVE BOX
OTOLARYNGOLOGY (ENT) PROGRESS NOTE    PATIENT: PERLA MAC  MRN: [id number]  : 62  [id number]  DATE OF SERVICE:  24  			         ID:PERLA MAC is a 63F PMH shizophrenia, HTN, non functioning pituitary macroadenoma s/p gross total pituitary resection, c/b low flow CSF leak, closed  w duragen, adheris, nasoseptal flap, mucosal graft from MT to septum .    Subjective/ Interval:   ; patient seen this morning, no overnight events, UOP in range,  start saline sprays today, lopez splints will remain in place for a total of two weeks   : patient seen and examined at bedside. AFVSS. no overnight events. Using saline sprays frequently      MEDICATIONS  (STANDING):  acetaminophen     Tablet .. 1000 milliGRAM(s) Oral every 8 hours  amLODIPine   Tablet 5 milliGRAM(s) Oral daily  benztropine 1 milliGRAM(s) Oral daily  carvedilol 25 milliGRAM(s) Oral every 12 hours  cefTRIAXone   IVPB 2000 milliGRAM(s) IV Intermittent every 12 hours  enoxaparin Injectable 40 milliGRAM(s) SubCutaneous <User Schedule>  latanoprost 0.005% Ophthalmic Solution 1 Drop(s) Both EYES at bedtime  losartan 25 milliGRAM(s) Oral daily  OLANZapine 10 milliGRAM(s) Oral daily  ondansetron   Disintegrating Tablet 4 milliGRAM(s) Oral every 8 hours  polyethylene glycol 3350 17 Gram(s) Oral daily  senna 2 Tablet(s) Oral at bedtime  sodium chloride 0.65% Nasal 2 Spray(s) Both Nostrils every 4 hours    MEDICATIONS  (PRN):  ondansetron Injectable 4 milliGRAM(s) IV Push every 6 hours PRN Nausea and/or Vomiting  oxyCODONE    IR 5 milliGRAM(s) Oral every 4 hours PRN Moderate Pain (4 - 6)  oxyCODONE    IR 10 milliGRAM(s) Oral every 4 hours PRN Severe Pain (7 - 10)        Vital Signs Last 24 Hrs  T(C): 36.7 (2024 05:02), Max: 37.2 (2024 09:15)  T(F): 98 (2024 05:02), Max: 99 (2024 09:15)  HR: 58 (2024 05:02) (58 - 71)  BP: 133/83 (2024 05:02) (113/75 - 155/89)  BP(mean): 85 (2024 14:45) (82 - 116)  RR: 17 (2024 05:02) (16 - 20)  SpO2: 94% (2024 05:02) (92% - 98%)    Parameters below as of 2024 05:02  Patient On (Oxygen Delivery Method): room air                PHYSICAL EXAM:  GEN: appears stated age, NAD  NEURO: alert & oriented x /  HEENT lopez splints in place, small amount of bloody crusting- mustache dressing applied, no bleeding in OP  CVS: regular rate and rhythm  Pulm: normal respiratory excursions, not tachypneic, no labored breathing  Abd: non-distended  Ext: moving all four extremities, no peripheral edema noted         .  LABS:                         13.1   12.19 )-----------( 263      ( 2024 05:30 )             39.5     04-23    135  |  100  |  16  ----------------------------<  117<H>  3.9   |  24  |  0.85    Ca    9.5      2024 22:42  Phos  3.9     04-23  Mg     2.5     04-23    TPro  6.4  /  Alb  4.0  /  TBili  0.2  /  DBili  x   /  AST  18  /  ALT  26  /  AlkPhos  95  04-22    PT/INR - ( 2024 17:57 )   PT: 13.2 sec;   INR: 1.16          PTT - ( 2024 17:57 )  PTT:25.0 sec  Urinalysis Basic - ( 2024 22:42 )    Color: x / Appearance: x / SG: x / pH: x  Gluc: 117 mg/dL / Ketone: x  / Bili: x / Urobili: x   Blood: x / Protein: x / Nitrite: x   Leuk Esterase: x / RBC: x / WBC x   Sq Epi: x / Non Sq Epi: x / Bacteria: x                RADIOLOGY, EKG & ADDITIONAL TESTS: Reviewed.

## 2024-04-24 NOTE — PROGRESS NOTE ADULT - ATTENDING COMMENTS
Pt seen on rounds this afternoon.  Had no complaints except for nasal congestion (packing has been removed)  Urine output is normal--600 cc 7 PM to 7 AM today  Exam unchanged except for nasal voice.  Intermittent exotropia still present  Cortisol level is down to 19 mcg/dl, and serum Na normal at 137 meq/l  --To continue to monitor for DI, but no evidence of this at present  --Continue off hydrocortisone  --Repeat MRI is apparently pending

## 2024-04-24 NOTE — CONSULT NOTE ADULT - TIME BILLING
Review of hospital course, labs, vitals, medical records.   Bedside exam and interview    Discussed plan of care with primary team   Documenting the encounter
Monitoring for DI, SIADH, hypopituitarism

## 2024-04-24 NOTE — PROGRESS NOTE ADULT - SUBJECTIVE AND OBJECTIVE BOX
HPI:  Information below taken from outpatient neurosurgery records:    Aniyah Coleman  64 y/o female with PMHx of HTN, anxiety, bipolar/schizophrenia, tremors, glaucoma, incidentally found to have pituitary mass while admitted at [facility] for psychosis Feb 2022.Following pituitary lesion dx, patient has been following with endocrinologist Dr. Caesar Thakkar. Endo labs notable for cortisol 23.3. Recently saw neurologist Dr. Hightower for evaluation of pituitary lesion who ordered new MRI for further evaluation which she completed 11/13/23 which showed 2 x 2 x 2 cm right pituitary mass with erosion of the dorsal clivus and extension into the suprasellar cistern. Repeat MRI at 4 months shows increase in size. Patient is now here for endoscopic endonasal resection of pituitary mass.      (22 Apr 2024 12:37)    INTERVAL EVENTS:  Feeling well. PRIYANK o/n.    HOSPITAL COURSE:  4/22: POD 0 TSP resection for pituitary lesion. 1g IV tylenol for HA, oxy 10. Carvedilol x1, weaning cardene.   4/23: POD 1 TSP rsxn for pituitary lesion. PRIYANK overnight. Neuro stable. Na+ 132 from 140 post inc PO intake. Per Dr. Montes, encourage dec PO H2O intake, pending repeat Na+. Cardene gtt dc'd. endo consulted f/u recs.   4/24: POD 2. Na 135 o/n.       Vital Signs Last 24 Hrs  T(C): 37.1 (23 Apr 2024 20:31), Max: 37.9 (23 Apr 2024 01:09)  T(F): 98.8 (23 Apr 2024 20:31), Max: 100.2 (23 Apr 2024 01:09)  HR: 58 (23 Apr 2024 20:31) (58 - 71)  BP: 124/80 (23 Apr 2024 20:31) (113/75 - 155/89)  BP(mean): 85 (23 Apr 2024 14:45) (80 - 116)  RR: 18 (23 Apr 2024 20:31) (16 - 20)  SpO2: 94% (23 Apr 2024 20:31) (91% - 98%)    Parameters below as of 23 Apr 2024 20:31  Patient On (Oxygen Delivery Method): room air        I&O's Summary    22 Apr 2024 07:01  -  23 Apr 2024 07:00  --------------------------------------------------------  IN: 2109 mL / OUT: 2240 mL / NET: -131 mL    23 Apr 2024 07:01  -  24 Apr 2024 00:44  --------------------------------------------------------  IN: 600 mL / OUT: 1000 mL / NET: -400 mL        PHYSICAL EXAM:  General: NAD, pt is comfortably sitting up in bed, on room air  HEENT: (+) moustache dressing, (+) b/l lopez splints in place  CN II-XII intact, PERRL 3mm briskly reactive, EOMI b/l, (+) VF intact, face symmetric, tongue midline, neck FROM  Cardiovascular: RRR, S1, S2  Respiratory: non-labored breathing on RA, chest rise symmetric  GI: abd soft, NTND   Neuro: A&Ox3, No aphasia, speech clear, no dysmetria, no pronator drift. Follows commands.  SCANLON x4 spontaneously, 5/5 strength in all extremities throughout. Sensation intact  Extremities: distal pulses 2+ x4  Wound/incision: b/l lopez splints in place w moustache dressing   LABS:                        13.1   12.19 )-----------( 263      ( 23 Apr 2024 05:30 )             39.5     04-23    135  |  100  |  16  ----------------------------<  117<H>  3.9   |  24  |  0.85    Ca    9.5      23 Apr 2024 22:42  Phos  3.9     04-23  Mg     2.5     04-23    TPro  6.4  /  Alb  4.0  /  TBili  0.2  /  DBili  x   /  AST  18  /  ALT  26  /  AlkPhos  95  04-22    PT/INR - ( 22 Apr 2024 17:57 )   PT: 13.2 sec;   INR: 1.16          PTT - ( 22 Apr 2024 17:57 )  PTT:25.0 sec  Urinalysis Basic - ( 23 Apr 2024 22:42 )    Color: x / Appearance: x / SG: x / pH: x  Gluc: 117 mg/dL / Ketone: x  / Bili: x / Urobili: x   Blood: x / Protein: x / Nitrite: x   Leuk Esterase: x / RBC: x / WBC x   Sq Epi: x / Non Sq Epi: x / Bacteria: x          CAPILLARY BLOOD GLUCOSE          Drug Levels: [] N/A    CSF Analysis: [] N/A      Allergies    No Known Allergies    Intolerances      MEDICATIONS:  Antibiotics:  cefTRIAXone   IVPB 2000 milliGRAM(s) IV Intermittent every 12 hours    Neuro:  acetaminophen     Tablet .. 1000 milliGRAM(s) Oral every 8 hours  benztropine 1 milliGRAM(s) Oral daily  OLANZapine 10 milliGRAM(s) Oral daily  ondansetron   Disintegrating Tablet 4 milliGRAM(s) Oral every 8 hours  ondansetron Injectable 4 milliGRAM(s) IV Push every 6 hours PRN  oxyCODONE    IR 5 milliGRAM(s) Oral every 4 hours PRN  oxyCODONE    IR 10 milliGRAM(s) Oral every 4 hours PRN    Anticoagulation:  enoxaparin Injectable 40 milliGRAM(s) SubCutaneous <User Schedule>    OTHER:  amLODIPine   Tablet 5 milliGRAM(s) Oral daily  carvedilol 25 milliGRAM(s) Oral every 12 hours  latanoprost 0.005% Ophthalmic Solution 1 Drop(s) Both EYES at bedtime  losartan 25 milliGRAM(s) Oral daily  polyethylene glycol 3350 17 Gram(s) Oral daily  senna 2 Tablet(s) Oral at bedtime  sodium chloride 0.65% Nasal 2 Spray(s) Both Nostrils every 4 hours    IVF:    CULTURES:    RADIOLOGY & ADDITIONAL TESTS:      ASSESSMENT:  63F PMHx HTN, anxiety, bipolar/schizophrenia, tremors, glaucoma was incidentally found to have pituitary mass on workup for psychosis in 2022, repeat MRI showed increase in size. S/p elective TSP resection for pituitary lesion (4/22/24). Frozen: pituitary tissue.    PLAN:  Neuro:  - Neuro/vitals q4hr.   - Pain control: cranial ERAS  - Post-op MRI w/in 72 hrs  - Skullbase precautions   - Bipolar/schizophrenia: Olanzapine 10mg daily     ENT:   - Lopez splints BL, remove outpatient in 2 weeks  - Ceftriaxone 2g BID while in house per ENT, Augmentin on DC    Cards:   - -140  - HTN: Continue Amlodipine 5mg, carvedilol 25mg BID, losartan 25 daily   - EKG Qtc 429 4/22    Pulm:  - RA  - NO incentive spirometry     GI:  - regular diet   - Bowel regimen     Renal:  - IVL, voiding  - DI watch    Endo:  - ISS, A1c 5.4%   - Endo consulted, f/u recs    Heme:   - SCDs, SQL for DVT prophylaxis     ID:  - Afebrile   - ceftriaxone while in hospital, augmentin x 10 d on DC    Misc:  - Glaucoma: Latanoprost drops    PT/OT rec: home PT/OT    Discussed with Dr. D'Amico

## 2024-04-24 NOTE — DISCHARGE NOTE NURSING/CASE MANAGEMENT/SOCIAL WORK - PATIENT PORTAL LINK FT
You can access the FollowMyHealth Patient Portal offered by [facility] by registering at the following website: [email or web address]. By joining [employer]’s FollowMyHealth portal, you will also be able to view your health information using other applications (apps) compatible with our system.

## 2024-04-24 NOTE — DISCHARGE NOTE NURSING/CASE MANAGEMENT/SOCIAL WORK - NSDCPEFALRISK_GEN_ALL_CORE
For information on Fall & Injury Prevention, visit: [email or web address] OR  [email or web address] OR  https://www.cdc.gov/steadi/patient.html

## 2024-04-24 NOTE — PROGRESS NOTE ADULT - ASSESSMENT
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  PLAN:  - lopez splints will remain in place for a total of 2 weeks ( to be removed outpatient )   - Start nasal saline sprays today QID   - skullbase precautions: no nose blowing, sneeze with mouth open, no drinking out of a straw, no straining    - Please have patient on CTX while in house and DC on augmentin   - f/u MRI      Page ENT at [phone] with any questions/concerns.

## 2024-04-24 NOTE — CONSULT NOTE ADULT - ASSESSMENT
62yo F with Hx of HTN, anxiety, bipolar/schizophrenia, tremors, glaucoma was incidentally found to have pituitary mass on workup for psychosis in 2022, repeat MRI showed increase in size. S/p elective TSP resection for pituitary lesion (4/22/24). Frozen: pituitary tissue.    #Pituitary lesion  - s/p resection  - management per NSG and ENT primary team  - c/w CTX while splints in per ENT  - pain control and bowel regimen  - Endo following for DI watch  - pending MRI and OT    #HTN  - c/w Amlodipine 5mg, Coreg 25mg, Losartan 25mg    #Bipolar   - c/w home Olanzapine and benztropine    #Glaucoma  - c/w home eye drops    #Leukocytosis  - WBC 14 post op, likely reactive  - normalized, afebrile, no localizing infectious symptoms    #DVT ppx: Lovenox 64yo F with Hx of HTN, anxiety, bipolar/schizophrenia, tremors, glaucoma was incidentally found to have pituitary mass on workup for psychosis in 2022, repeat MRI showed increase in size. S/p elective TSP resection for pituitary lesion (4/22/24). Frozen: pituitary tissue.    #Pituitary lesion  - s/p resection  - management per NSG and ENT primary team  - c/w CTX while splints in per ENT  - pain control and bowel regimen  - Endo following for DI watch  - pending MRI and OT    #HTN  - c/w Amlodipine 5mg, Coreg 25mg, Losartan 25mg    #Bipolar   - c/w home Olanzapine and benztropine    #Glaucoma  - c/w home eye drops    #Leukocytosis  - WBC 14 post op, likely reactive  - normalized, afebrile, no localizing infectious symptoms    #Morbid obesity  - affects all aspects of care  - outpatient f/u and lifestyle modifications  #DVT ppx: Lovenox

## 2024-04-24 NOTE — PROGRESS NOTE ADULT - SUBJECTIVE AND OBJECTIVE BOX
SUBJECTIVE / INTERVAL HPI: Patient was seen and examined this morning.     Overnight events:  urine output 1.6L for 24 hours  600 past 12 hours till 7am  800 from from 7am until 12pm  cortisol AM 19.720  yesterday UA showed specific gravity 1.009, Leonardo 20 with Uosm 188    REVIEW OF SYSTEMS  Constitutional:  Negative fever, chills   Cardiovascular:  Negative for chest pain or palpitations.  Respiratory:  Negative for cough, wheezing, or shortness of breath.    Gastrointestinal:  Negative for nausea, vomiting, diarrhea, constipation, or abdominal pain.  Genitourinary:  Negative frequency, urgency or dysuria.  Neurologic:  No headache, confusion, dizziness, lightheadedness.    PHYSICAL EXAM  Vital Signs Last 24 Hrs  T(C): 36.5 (24 Apr 2024 08:20), Max: 37.2 (23 Apr 2024 17:49)  T(F): 97.7 (24 Apr 2024 08:20), Max: 99 (23 Apr 2024 17:49)  HR: 59 (24 Apr 2024 08:20) (58 - 71)  BP: 142/88 (24 Apr 2024 08:20) (116/70 - 155/89)  BP(mean): 85 (23 Apr 2024 14:45) (85 - 114)  RR: 18 (24 Apr 2024 08:20) (17 - 20)  SpO2: 95% (24 Apr 2024 08:20) (94% - 98%)    Parameters below as of 24 Apr 2024 08:20  Patient On (Oxygen Delivery Method): room air        Constitutional: Awake, alert, in no acute distress.   HEENT: Normocephalic, atraumatic, LIDIA.  Respiratory: Lungs clear to ausculation bilaterally.   Cardiovascular: regular rhythm, normal S1 and S2, no audible murmurs.   GI: soft, non-tender, non-distended, bowel sounds present.  Extremities: No lower extremity edema.     LABS  CBC - WBC/HGB/HTC/PLT: 8.52/12.4/37.6/241 (04-24-24)  BMP - Na/K/Cl/Bicarb/BUN/Cr/Gluc/AG/eGFR: 137/4.0/102/26/19/1.01/100/9/63 (04-24-24)  Ca - 9.6 (04-24-24)  Phos - 3.8 (04-24-24)  Mg - 2.4 (04-24-24)  LFT - Alb/Tprot/Tbili/Dbili/AlkPhos/ALT/AST: 4.0/--/0.2/--/95/26/18 (04-22-24)  PT/aPTT/INR: 13.2/25.0/1.16 (04-22-24)         04-23-24 @ 07:01  -  04-24-24 @ 07:00  --------------------------------------------------------  IN: 600 mL / OUT: 1600 mL / NET: -1000 mL    04-24-24 @ 07:01  -  04-24-24 @ 13:25  --------------------------------------------------------  IN: 600 mL / OUT: 800 mL / NET: -200 mL        MEDICATIONS  MEDICATIONS  (STANDING):  acetaminophen     Tablet .. 1000 milliGRAM(s) Oral every 8 hours  amLODIPine   Tablet 5 milliGRAM(s) Oral daily  benztropine 1 milliGRAM(s) Oral daily  carvedilol 25 milliGRAM(s) Oral every 12 hours  cefTRIAXone   IVPB 2000 milliGRAM(s) IV Intermittent every 12 hours  enoxaparin Injectable 40 milliGRAM(s) SubCutaneous <User Schedule>  latanoprost 0.005% Ophthalmic Solution 1 Drop(s) Both EYES at bedtime  losartan 25 milliGRAM(s) Oral daily  OLANZapine 10 milliGRAM(s) Oral daily  ondansetron   Disintegrating Tablet 4 milliGRAM(s) Oral every 8 hours  polyethylene glycol 3350 17 Gram(s) Oral daily  senna 2 Tablet(s) Oral at bedtime  sodium chloride 0.65% Nasal 2 Spray(s) Both Nostrils every 4 hours    MEDICATIONS  (PRN):  ondansetron Injectable 4 milliGRAM(s) IV Push every 6 hours PRN Nausea and/or Vomiting  oxyCODONE    IR 5 milliGRAM(s) Oral every 4 hours PRN Moderate Pain (4 - 6)  oxyCODONE    IR 10 milliGRAM(s) Oral every 4 hours PRN Severe Pain (7 - 10)    ASSESSMENT / RECOMMENDATIONS      PERLA MAC is a 63y Female with a past medical history of HTN, bioplar disorder, schizophrenia, pituitary macroadenoma is now s/p endoscopic transphenoidal or transnasal resection of pituitary tumor on 4/22.   A1C: 5.4 %  BUN: 10  Creatinine: 0.69  GFR: 98  Weight: 112.2  BMI: 38.7    # pituitary adenoma s/p resection  - monitoring off steroids  - morning cortisol 04/23: 45.690, 04/24: 19.720  - trend daily morning cortisol  - please strict Is/Os - DI should be cautioned when the urine output >150-200ml/hr for more than 2 consecutive hours  - monitor BMP Q8  - NA improved to 137 today  - repeat hormone labs in one month    - Patient can follow up at discharge with [facility]    Case discussed with Dr. Abad. Primary team updated.       Roro Patiño  Endocrinology Fellow    Service Pager: [phone]    SUBJECTIVE / INTERVAL HPI: Patient was seen and examined this morning.     Overnight events:  urine output 1.6L for 24 hours  600 past 12 hours till 7am  800 from from 7am until 12pm  cortisol AM 19.720  yesterday UA showed specific gravity 1.009, Leonardo 20 with Uosm 188  pt states she did not wake up last night to urinate    REVIEW OF SYSTEMS  Constitutional:  Negative fever, chills   Cardiovascular:  Negative for chest pain or palpitations.  Respiratory:  Negative for cough, wheezing, or shortness of breath.    Gastrointestinal:  Negative for nausea, vomiting, diarrhea, constipation, or abdominal pain.  Genitourinary:  Negative frequency, urgency or dysuria.  Neurologic:  No headache, confusion, dizziness, lightheadedness.    PHYSICAL EXAM  Vital Signs Last 24 Hrs  T(C): 36.5 (24 Apr 2024 08:20), Max: 37.2 (23 Apr 2024 17:49)  T(F): 97.7 (24 Apr 2024 08:20), Max: 99 (23 Apr 2024 17:49)  HR: 59 (24 Apr 2024 08:20) (58 - 71)  BP: 142/88 (24 Apr 2024 08:20) (116/70 - 155/89)  BP(mean): 85 (23 Apr 2024 14:45) (85 - 114)  RR: 18 (24 Apr 2024 08:20) (17 - 20)  SpO2: 95% (24 Apr 2024 08:20) (94% - 98%)    Parameters below as of 24 Apr 2024 08:20  Patient On (Oxygen Delivery Method): room air        Constitutional: Awake, alert, in no acute distress.   HEENT: Normocephalic, atraumatic, LIDIA.  Respiratory: Lungs clear to ausculation bilaterally.   Cardiovascular: regular rhythm, normal S1 and S2, no audible murmurs.   GI: soft, non-tender, non-distended, bowel sounds present.  Extremities: No lower extremity edema.     LABS  CBC - WBC/HGB/HTC/PLT: 8.52/12.4/37.6/241 (04-24-24)  BMP - Na/K/Cl/Bicarb/BUN/Cr/Gluc/AG/eGFR: 137/4.0/102/26/19/1.01/100/9/63 (04-24-24)  Ca - 9.6 (04-24-24)  Phos - 3.8 (04-24-24)  Mg - 2.4 (04-24-24)  LFT - Alb/Tprot/Tbili/Dbili/AlkPhos/ALT/AST: 4.0/--/0.2/--/95/26/18 (04-22-24)  PT/aPTT/INR: 13.2/25.0/1.16 (04-22-24)         04-23-24 @ 07:01  -  04-24-24 @ 07:00  --------------------------------------------------------  IN: 600 mL / OUT: 1600 mL / NET: -1000 mL    04-24-24 @ 07:01  -  04-24-24 @ 13:25  --------------------------------------------------------  IN: 600 mL / OUT: 800 mL / NET: -200 mL        MEDICATIONS  MEDICATIONS  (STANDING):  acetaminophen     Tablet .. 1000 milliGRAM(s) Oral every 8 hours  amLODIPine   Tablet 5 milliGRAM(s) Oral daily  benztropine 1 milliGRAM(s) Oral daily  carvedilol 25 milliGRAM(s) Oral every 12 hours  cefTRIAXone   IVPB 2000 milliGRAM(s) IV Intermittent every 12 hours  enoxaparin Injectable 40 milliGRAM(s) SubCutaneous <User Schedule>  latanoprost 0.005% Ophthalmic Solution 1 Drop(s) Both EYES at bedtime  losartan 25 milliGRAM(s) Oral daily  OLANZapine 10 milliGRAM(s) Oral daily  ondansetron   Disintegrating Tablet 4 milliGRAM(s) Oral every 8 hours  polyethylene glycol 3350 17 Gram(s) Oral daily  senna 2 Tablet(s) Oral at bedtime  sodium chloride 0.65% Nasal 2 Spray(s) Both Nostrils every 4 hours    MEDICATIONS  (PRN):  ondansetron Injectable 4 milliGRAM(s) IV Push every 6 hours PRN Nausea and/or Vomiting  oxyCODONE    IR 5 milliGRAM(s) Oral every 4 hours PRN Moderate Pain (4 - 6)  oxyCODONE    IR 10 milliGRAM(s) Oral every 4 hours PRN Severe Pain (7 - 10)    ASSESSMENT / RECOMMENDATIONS      PERLA MAC is a 63y Female with a past medical history of HTN, bioplar disorder, schizophrenia, pituitary macroadenoma is now s/p endoscopic transphenoidal or transnasal resection of pituitary tumor on 4/22.   A1C: 5.4 %  BUN: 10  Creatinine: 0.69  GFR: 98  Weight: 112.2  BMI: 38.7    # pituitary adenoma s/p resection  - monitoring off steroids  - morning cortisol 04/23: 45.690, 04/24: 19.720  - trend daily morning cortisol  - please strict Is/Os - DI should be cautioned when the urine output >150-200ml/hr for more than 2 consecutive hours  - monitor BMP Q8  - NA improved to 137 today  - repeat hormone labs in one month    - Patient can follow up at discharge with [facility]    Case discussed with Dr. Abad. Primary team updated.       Roro Patiño  Endocrinology Fellow    Service Pager: [phone]

## 2024-04-24 NOTE — CHART NOTE - NSCHARTNOTEFT_GEN_A_CORE
increased bowel regimen and given lactulose due to straining. Pending MRI. Recommended for home PT/OT.

## 2024-04-25 ENCOUNTER — NON-APPOINTMENT (OUTPATIENT)
Age: 64
End: 2024-04-25

## 2024-04-25 ENCOUNTER — TRANSCRIPTION ENCOUNTER (OUTPATIENT)
Age: 64
End: 2024-04-25

## 2024-04-25 VITALS — DIASTOLIC BLOOD PRESSURE: 84 MMHG | HEART RATE: 68 BPM | SYSTOLIC BLOOD PRESSURE: 132 MMHG

## 2024-04-25 PROBLEM — E23.6 OTHER DISORDERS OF PITUITARY GLAND: Chronic | Status: ACTIVE | Noted: 2024-04-19

## 2024-04-25 LAB
ANION GAP SERPL CALC-SCNC: 9 MMOL/L — SIGNIFICANT CHANGE UP (ref 5–17)
BUN SERPL-MCNC: 14 MG/DL — SIGNIFICANT CHANGE UP (ref 7–23)
CALCIUM SERPL-MCNC: 9.5 MG/DL — SIGNIFICANT CHANGE UP (ref 8.4–10.5)
CHLORIDE SERPL-SCNC: 106 MMOL/L — SIGNIFICANT CHANGE UP (ref 96–108)
CO2 SERPL-SCNC: 26 MMOL/L — SIGNIFICANT CHANGE UP (ref 22–31)
CREAT SERPL-MCNC: 0.88 MG/DL — SIGNIFICANT CHANGE UP (ref 0.5–1.3)
EGFR: 74 ML/MIN/1.73M2 — SIGNIFICANT CHANGE UP
GLUCOSE SERPL-MCNC: 90 MG/DL — SIGNIFICANT CHANGE UP (ref 70–99)
HCT VFR BLD CALC: 36.2 % — SIGNIFICANT CHANGE UP (ref 34.5–45)
HGB BLD-MCNC: 11.8 G/DL — SIGNIFICANT CHANGE UP (ref 11.5–15.5)
MAGNESIUM SERPL-MCNC: 2.1 MG/DL — SIGNIFICANT CHANGE UP (ref 1.6–2.6)
MCHC RBC-ENTMCNC: 29.8 PG — SIGNIFICANT CHANGE UP (ref 27–34)
MCHC RBC-ENTMCNC: 32.6 GM/DL — SIGNIFICANT CHANGE UP (ref 32–36)
MCV RBC AUTO: 91.4 FL — SIGNIFICANT CHANGE UP (ref 80–100)
NRBC # BLD: 0 /100 WBCS — SIGNIFICANT CHANGE UP (ref 0–0)
PHOSPHATE SERPL-MCNC: 3.7 MG/DL — SIGNIFICANT CHANGE UP (ref 2.5–4.5)
PLATELET # BLD AUTO: 237 K/UL — SIGNIFICANT CHANGE UP (ref 150–400)
POTASSIUM SERPL-MCNC: 4.2 MMOL/L — SIGNIFICANT CHANGE UP (ref 3.5–5.3)
POTASSIUM SERPL-SCNC: 4.2 MMOL/L — SIGNIFICANT CHANGE UP (ref 3.5–5.3)
RBC # BLD: 3.96 M/UL — SIGNIFICANT CHANGE UP (ref 3.8–5.2)
RBC # FLD: 15.3 % — HIGH (ref 10.3–14.5)
SODIUM SERPL-SCNC: 141 MMOL/L — SIGNIFICANT CHANGE UP (ref 135–145)
WBC # BLD: 7.7 K/UL — SIGNIFICANT CHANGE UP (ref 3.8–10.5)
WBC # FLD AUTO: 7.7 K/UL — SIGNIFICANT CHANGE UP (ref 3.8–10.5)

## 2024-04-25 PROCEDURE — C1889: CPT

## 2024-04-25 PROCEDURE — 84100 ASSAY OF PHOSPHORUS: CPT

## 2024-04-25 PROCEDURE — 97165 OT EVAL LOW COMPLEX 30 MIN: CPT

## 2024-04-25 PROCEDURE — 70553 MRI BRAIN STEM W/O & W/DYE: CPT | Mod: MC

## 2024-04-25 PROCEDURE — 88331 PATH CONSLTJ SURG 1 BLK 1SPC: CPT

## 2024-04-25 PROCEDURE — 86850 RBC ANTIBODY SCREEN: CPT

## 2024-04-25 PROCEDURE — 82533 TOTAL CORTISOL: CPT

## 2024-04-25 PROCEDURE — 85610 PROTHROMBIN TIME: CPT

## 2024-04-25 PROCEDURE — 88342 IMHCHEM/IMCYTCHM 1ST ANTB: CPT

## 2024-04-25 PROCEDURE — 82947 ASSAY GLUCOSE BLOOD QUANT: CPT

## 2024-04-25 PROCEDURE — 97116 GAIT TRAINING THERAPY: CPT

## 2024-04-25 PROCEDURE — 88305 TISSUE EXAM BY PATHOLOGIST: CPT

## 2024-04-25 PROCEDURE — 84295 ASSAY OF SERUM SODIUM: CPT

## 2024-04-25 PROCEDURE — 99232 SBSQ HOSP IP/OBS MODERATE 35: CPT

## 2024-04-25 PROCEDURE — 85018 HEMOGLOBIN: CPT

## 2024-04-25 PROCEDURE — 83935 ASSAY OF URINE OSMOLALITY: CPT

## 2024-04-25 PROCEDURE — 85027 COMPLETE CBC AUTOMATED: CPT

## 2024-04-25 PROCEDURE — 81003 URINALYSIS AUTO W/O SCOPE: CPT

## 2024-04-25 PROCEDURE — A9585: CPT

## 2024-04-25 PROCEDURE — 83036 HEMOGLOBIN GLYCOSYLATED A1C: CPT

## 2024-04-25 PROCEDURE — 88341 IMHCHEM/IMCYTCHM EA ADD ANTB: CPT

## 2024-04-25 PROCEDURE — 82962 GLUCOSE BLOOD TEST: CPT

## 2024-04-25 PROCEDURE — 83735 ASSAY OF MAGNESIUM: CPT

## 2024-04-25 PROCEDURE — 36415 COLL VENOUS BLD VENIPUNCTURE: CPT

## 2024-04-25 PROCEDURE — 84300 ASSAY OF URINE SODIUM: CPT

## 2024-04-25 PROCEDURE — 88360 TUMOR IMMUNOHISTOCHEM/MANUAL: CPT

## 2024-04-25 PROCEDURE — 84132 ASSAY OF SERUM POTASSIUM: CPT

## 2024-04-25 PROCEDURE — 82330 ASSAY OF CALCIUM: CPT

## 2024-04-25 PROCEDURE — 86900 BLOOD TYPING SEROLOGIC ABO: CPT

## 2024-04-25 PROCEDURE — 85730 THROMBOPLASTIN TIME PARTIAL: CPT

## 2024-04-25 PROCEDURE — 88333 PATH CONSLTJ SURG CYTO XM 1: CPT

## 2024-04-25 PROCEDURE — 86901 BLOOD TYPING SEROLOGIC RH(D): CPT

## 2024-04-25 PROCEDURE — 80053 COMPREHEN METABOLIC PANEL: CPT

## 2024-04-25 PROCEDURE — 86803 HEPATITIS C AB TEST: CPT

## 2024-04-25 PROCEDURE — 97161 PT EVAL LOW COMPLEX 20 MIN: CPT

## 2024-04-25 PROCEDURE — 80048 BASIC METABOLIC PNL TOTAL CA: CPT

## 2024-04-25 RX ORDER — SODIUM CHLORIDE 0.65 %
1 AEROSOL, SPRAY (ML) NASAL
Qty: 1 | Refills: 0
Start: 2024-04-25 | End: 2024-05-04

## 2024-04-25 RX ORDER — NALOXONE HYDROCHLORIDE 4 MG/.1ML
1 SPRAY NASAL
Qty: 1 | Refills: 0
Start: 2024-04-25 | End: 2024-04-25

## 2024-04-25 RX ORDER — DOCUSATE SODIUM 100 MG
0 CAPSULE ORAL
Refills: 0 | DISCHARGE

## 2024-04-25 RX ORDER — ACETAMINOPHEN 500 MG
2 TABLET ORAL
Qty: 0 | Refills: 0 | DISCHARGE

## 2024-04-25 RX ORDER — AMLODIPINE BESYLATE 2.5 MG/1
1 TABLET ORAL
Qty: 0 | Refills: 0 | DISCHARGE
Start: 2024-04-25

## 2024-04-25 RX ORDER — POLYETHYLENE GLYCOL 3350 17 G/17G
17 POWDER, FOR SOLUTION ORAL
Qty: 0 | Refills: 0 | DISCHARGE
Start: 2024-04-25

## 2024-04-25 RX ORDER — OXYCODONE HYDROCHLORIDE 5 MG/1
1 TABLET ORAL
Qty: 20 | Refills: 0
Start: 2024-04-25 | End: 2024-04-29

## 2024-04-25 RX ADMIN — OXYCODONE HYDROCHLORIDE 10 MILLIGRAM(S): 5 TABLET ORAL at 00:54

## 2024-04-25 RX ADMIN — AMLODIPINE BESYLATE 5 MILLIGRAM(S): 2.5 TABLET ORAL at 05:54

## 2024-04-25 RX ADMIN — CARVEDILOL PHOSPHATE 25 MILLIGRAM(S): 80 CAPSULE, EXTENDED RELEASE ORAL at 11:52

## 2024-04-25 RX ADMIN — Medication 2 SPRAY(S): at 06:05

## 2024-04-25 RX ADMIN — Medication 1 MILLIGRAM(S): at 11:51

## 2024-04-25 RX ADMIN — POLYETHYLENE GLYCOL 3350 17 GRAM(S): 17 POWDER, FOR SOLUTION ORAL at 05:54

## 2024-04-25 RX ADMIN — CEFTRIAXONE 100 MILLIGRAM(S): 500 INJECTION, POWDER, FOR SOLUTION INTRAMUSCULAR; INTRAVENOUS at 12:44

## 2024-04-25 RX ADMIN — LOSARTAN POTASSIUM 25 MILLIGRAM(S): 100 TABLET, FILM COATED ORAL at 05:54

## 2024-04-25 NOTE — DISCHARGE NOTE PROVIDER - CARE PROVIDERS DIRECT ADDRESSES
[email or web address]

## 2024-04-25 NOTE — DISCHARGE NOTE PROVIDER - NSDCCPCAREPLAN_GEN_ALL_CORE_FT
PRINCIPAL DISCHARGE DIAGNOSIS  Diagnosis: Pituitary mass  Assessment and Plan of Treatment: S/p transphenoidal resection for pituitary lesion (4/22)      SECONDARY DISCHARGE DIAGNOSES  Diagnosis: Hypertension  Assessment and Plan of Treatment:     Diagnosis: Morbid obesity  Assessment and Plan of Treatment:     Diagnosis: Glaucoma  Assessment and Plan of Treatment:     Diagnosis: Prediabetes  Assessment and Plan of Treatment:     Diagnosis: Schizophrenia  Assessment and Plan of Treatment:

## 2024-04-25 NOTE — PROGRESS NOTE ADULT - SUBJECTIVE AND OBJECTIVE BOX
OTOLARYNGOLOGY (ENT) PROGRESS NOTE    PATIENT: PERLA MAC  MRN: [id number]  : 62  [id number]  DATE OF SERVICE:  24  			         ID:PERLA MAC is a 63F PMH shizophrenia, HTN, non functioning pituitary macroadenoma s/p gross total pituitary resection, c/b low flow CSF leak, closed  w duragen, adheris, nasoseptal flap, mucosal graft from MT to septum .    Subjective/ Interval:   ; patient seen this morning, no overnight events, UOP in range,  start saline sprays today, lopez splints will remain in place for a total of two weeks   : patient seen and examined at bedside. AFVSS. no overnight events. Using saline sprays frequently  : Patient seen and examined at bedside. AVSS, no bleeding overnight. Reminded patient about doyles and importance of leaving them in place, no nose blowing. Using saline sprays. Has not had BM yet.     ALLERGIES:  No Known Allergies      MEDICATIONS:  Antiinfectives:   cefTRIAXone   IVPB 2000 milliGRAM(s) IV Intermittent every 12 hours    IV fluids:    Hematologic/Anticoagulation:  enoxaparin Injectable 40 milliGRAM(s) SubCutaneous <User Schedule>    Pain medications/Neuro:  acetaminophen     Tablet .. 650 milliGRAM(s) Oral every 6 hours PRN  benztropine 1 milliGRAM(s) Oral daily  OLANZapine 10 milliGRAM(s) Oral daily  ondansetron Injectable 4 milliGRAM(s) IV Push every 6 hours PRN  oxyCODONE    IR 5 milliGRAM(s) Oral every 4 hours PRN  oxyCODONE    IR 10 milliGRAM(s) Oral every 4 hours PRN    Endocrine Medications:     All other standing medications:   amLODIPine   Tablet 5 milliGRAM(s) Oral daily  carvedilol 25 milliGRAM(s) Oral every 12 hours  latanoprost 0.005% Ophthalmic Solution 1 Drop(s) Both EYES at bedtime  losartan 25 milliGRAM(s) Oral daily  polyethylene glycol 3350 17 Gram(s) Oral two times a day  senna 2 Tablet(s) Oral at bedtime  sodium chloride 0.65% Nasal 2 Spray(s) Both Nostrils every 4 hours    All other PRN medications:    Vital Signs Last 24 Hrs  T(C): 37.7 (2024 05:00), Max: 37.7 (2024 05:00)  T(F): 99.8 (2024 05:00), Max: 99.8 (2024 05:00)  HR: 57 (2024 05:00) (55 - 59)  BP: 155/90 (2024 05:00) (114/72 - 155/90)  BP(mean): --  RR: 17 (2024 05:00) (17 - 18)  SpO2: 95% (:00) (95% - 96%)    Parameters below as of :00  Patient On (Oxygen Delivery Method): room air           @ 07:01  -   @ 07:00  --------------------------------------------------------  IN:    Oral Fluid: 600 mL  Total IN: 600 mL    OUT:    Voided (mL): 1650 mL  Total OUT: 1650 mL    Total NET: -1050 mL        PHYSICAL EXAM:  GEN: appears stated age, NAD  NEURO: alert & oriented x 4/4  HEENT lopez splints in place, small amount of bloody crusting- mustache dressing applied, no bleeding in OP  CVS: regular rate and rhythm  Pulm: normal respiratory excursions, not tachypneic, no labored breathing  Abd: non-distended  Ext: moving all four extremities, no peripheral edema noted       LABS                       11.8   7.70  )-----------( 237      ( 2024 05:30 )             36.2    04-    141  |  106  |  14  ----------------------------<  90  4.2   |  26  |  0.88    Ca    9.5      2024 05:30  Phos  3.7     04-25  Mg     2.1     04-25           Coagulation Studies-     Urinalysis Basic - ( 2024 05:30 )    Color: x / Appearance: x / SG: x / pH: x  Gluc: 90 mg/dL / Ketone: x  / Bili: x / Urobili: x   Blood: x / Protein: x / Nitrite: x   Leuk Esterase: x / RBC: x / WBC x   Sq Epi: x / Non Sq Epi: x / Bacteria: x      Endocrine Panel-  Calcium: 9.5 mg/dL ( @ 05:30)

## 2024-04-25 NOTE — DISCHARGE NOTE PROVIDER - PROVIDER TOKENS
PROVIDER:[TOKEN:[[id number]]],PROVIDER:[TOKEN:[[id number]]],PROVIDER:[TOKEN:[[id number]]]

## 2024-04-25 NOTE — PROGRESS NOTE ADULT - SUBJECTIVE AND OBJECTIVE BOX
HPI:  Information below taken from outpatient neurosurgery records:    Aniyah Coleman  62 y/o female with PMHx of HTN, anxiety, bipolar/schizophrenia, tremors, glaucoma, incidentally found to have pituitary mass while admitted at [facility] for psychosis 2022.Following pituitary lesion dx, patient has been following with endocrinologist Dr. Caesar Thakkar. Endo labs notable for cortisol 23.3. Recently saw neurologist Dr. Hightower for evaluation of pituitary lesion who ordered new MRI for further evaluation which she completed 23 which showed 2 x 2 x 2 cm right pituitary mass with erosion of the dorsal clivus and extension into the suprasellar cistern. Repeat MRI at 4 months shows increase in size. Patient is now here for endoscopic endonasal resection of pituitary mass.      (2024 12:37)    OVERNIGHT EVENTS: Seen and examined in 8WO. C/o right nare sensation where lopez splint in place. Denies any discharge/bleeding from area, states it's feeling better, ENT aware. Denies HA, N/V, chest pain, shortness of breath, new weakness or numbness.     HOSPITAL COURSE:  : POD 0 TSP resection for pituitary lesion. 1g IV tylenol for HA, oxy 10. Carvedilol x1, weaning cardene.   : POD 1 TSP rsxn for pituitary lesion. PRIYANK overnight. Neuro stable. Na+ 132 from 140 post inc PO intake. Per Dr. Montes, encourage dec PO H2O intake, pending repeat Na+. Cardene gtt dc'd. endo consulted f/u recs.   : POD 2. Na 135 o/n. increased bowel regimen and given lactulose due to straining. Pending MRI. Recommended for home PT/OT. Carvedilol held for systolic 114, HR 55. MRI complete.  : POD 3. PRIYANK overnight, neuro stable.     Vital Signs Last 24 Hrs  T(C): 37 (2024 20:45), Max: 37 (2024 20:45)  T(F): 98.6 (2024 20:45), Max: 98.6 (2024 20:45)  HR: 55 (2024 20:45) (55 - 59)  BP: 114/72 (2024 20:45) (114/72 - 142/88)  BP(mean): --  RR: 18 (2024 20:45) (17 - 18)  SpO2: 95% (2024 20:45) (94% - 96%)    Parameters below as of 2024 20:45  Patient On (Oxygen Delivery Method): room air        I&O's Summary    2024 07:01  -  2024 07:00  --------------------------------------------------------  IN: 600 mL / OUT: 1600 mL / NET: -1000 mL    2024 07:01  -  2024 01:31  --------------------------------------------------------  IN: 600 mL / OUT: 1200 mL / NET: -600 mL        PHYSICAL EXAM:  General: NAD, pt is comfortably sitting up in bed, on room air  HEENT: CN II-XII intact, PERRL 3mm briskly reactive, EOMI b/l, face symmetric, tongue midline, neck FROM  Cardiovascular: RRR, S1, S2  Respiratory: breathing non-labored on RA, chest rise symmetric  GI: abd soft, NTND   Neuro: A&Ox3, No aphasia, speech clear, no dysmetria, no pronator drift. Follows commands.  SCANLON x4 spontaneously, 5/5 strength in all extremities throughout. Sensation intact  Extremities: distal pulses 2+ x4  Wound/incision: b/l lopez splints in place  Drain: n/a    TUBES/LINES:  [] Shrestha  [] Wound Drains  [] Others      DIET:  [] NPO  [x] Mechanical  [] Tube feeds    LABS:                        12.4   8.52  )-----------( 241      ( 2024 05:30 )             37.6     04-24    137  |  102  |  19  ----------------------------<  100<H>  4.0   |  26  |  1.01    Ca    9.6      2024 05:30  Phos  3.8     04-24  Mg     2.4     04-24        Urinalysis Basic - ( 2024 05:30 )    Color: x / Appearance: x / SG: x / pH: x  Gluc: 100 mg/dL / Ketone: x  / Bili: x / Urobili: x   Blood: x / Protein: x / Nitrite: x   Leuk Esterase: x / RBC: x / WBC x   Sq Epi: x / Non Sq Epi: x / Bacteria: x    CAPILLARY BLOOD GLUCOSE      Drug Levels: [] N/A    CSF Analysis: [] N/A      Allergies    No Known Allergies    Intolerances      MEDICATIONS:  Antibiotics:  cefTRIAXone   IVPB 2000 milliGRAM(s) IV Intermittent every 12 hours    Neuro:  acetaminophen     Tablet .. 650 milliGRAM(s) Oral every 6 hours PRN  benztropine 1 milliGRAM(s) Oral daily  OLANZapine 10 milliGRAM(s) Oral daily  ondansetron Injectable 4 milliGRAM(s) IV Push every 6 hours PRN  oxyCODONE    IR 5 milliGRAM(s) Oral every 4 hours PRN  oxyCODONE    IR 10 milliGRAM(s) Oral every 4 hours PRN    Anticoagulation:  enoxaparin Injectable 40 milliGRAM(s) SubCutaneous <User Schedule>    OTHER:  amLODIPine   Tablet 5 milliGRAM(s) Oral daily  carvedilol 25 milliGRAM(s) Oral every 12 hours  latanoprost 0.005% Ophthalmic Solution 1 Drop(s) Both EYES at bedtime  losartan 25 milliGRAM(s) Oral daily  polyethylene glycol 3350 17 Gram(s) Oral two times a day  senna 2 Tablet(s) Oral at bedtime  sodium chloride 0.65% Nasal 2 Spray(s) Both Nostrils every 4 hours    IVF:    CULTURES:    RADIOLOGY & ADDITIONAL TESTS:  MRI complete  pending read     ASSESSMENT:  63F PMHx HTN, anxiety, bipolar/schizophrenia, tremors, glaucoma was incidentally found to have pituitary mass on workup for psychosis in , repeat MRI showed increase in size. S/p elective TSP resection for pituitary lesion (24). Frozen: pituitary tissue.    D35.2    Deviated nasal septum    Encounter for preprocedural cardiovascular examination    Handoff    MEWS Score    Pituitary mass    Morbid obesity    HTN (hypertension)    Schizophrenia    Glaucoma    Schizophrenia, schizoaffective    Prediabetes    Pituitary tumor    Pituitary adenoma    Endoscopic transphenoidal or transnasal resection of pituitary tumor    No significant past surgical history    History of 2  sections    [id number]        PLAN:  Neuro:  - Neuro/vitals q4hr.   - Pain control: cranial ERAS  - post-op MRI pending read   - Skullbase precautions   - Bipolar/schizophrenia: Olanzapine 10mg daily     ENT:   - Lopez splints BL, remove outpatient in 2 weeks  - Ceftriaxone 2g BID while in house per ENT, Augmentin on DC    Cards:   - -140  - HTN: Continue Amlodipine 5mg, carvedilol 25mg BID, losartan 25 daily   - EKG Qtc 429     Pulm:  - RA  - NO incentive spirometry     GI:  - regular diet   - Bowel regimen, last BM     Renal:  - IVL, voiding  - DI watch    Endo:  - ISS, A1c 5.4%   - Endo consulted, f/u recs    Heme:   - SCDs, SQL for DVT prophylaxis     ID:  - Afebrile   - ceftriaxone while in hospital, augmentin x 10 d on DC    Misc:  - Glaucoma: Latanoprost drops    PT/OT rec: home PT/OT    Discussed with Dr. D'Amico     Assessment:  Present when checked    []  GCS  E   V  M     Heart Failure: []Acute, [] acute on chronic , []chronic  Heart Failure:  [] Diastolic (HFpEF), [] Systolic (HFrEF), []Combined (HFpEF and HFrEF), [] RHF, [] Pulm HTN, [] Other    [] MICHAEL, [] ATN, [] AIN, [] other  [] CKD1, [] CKD2, [] CKD 3, [] CKD 4, [] CKD 5, []ESRD    Encephalopathy: [] Metabolic, [] Hepatic, [] toxic, [] Neurological, [] Other    Abnormal Nurtitional Status: [] malnurtition (see nutrition note), [ ]underweight: BMI < 19, [] morbid obesity: BMI >40, [] Cachexia    [] Sepsis  [] hypovolemic shock,[] cardiogenic shock, [] hemorrhagic shock, [] neuogenic shock  [] Acute Respiratory Failure  []Cerebral edema, [] Brain compression/ herniation,   [] Functional quadriplegia  [] Acute blood loss anemia

## 2024-04-25 NOTE — PROGRESS NOTE ADULT - REASON FOR ADMISSION
surgery for pituitary mass

## 2024-04-25 NOTE — DISCHARGE NOTE PROVIDER - NSDCFUADDAPPT_GEN_ALL_CORE_FT
Please follow up w/ Dr. D'Amico in the outpatient office. Call [phone] to confirm appointment date and time.     Follow up with Dr. Foreman (ENT) for lopez splints removal in 2 weeks. Call [phone] to schedule appointment.     Follow up at discharge with [facility] in 1 month for repeat labs. Call [phone] to make an appointment    Please also follow up with your Primary Care Doctor.

## 2024-04-25 NOTE — DISCHARGE NOTE PROVIDER - NSDCMRMEDTOKEN_GEN_ALL_CORE_FT
acetaminophen 500 mg oral tablet: 2 tab(s) orally every 4 to 6 hours  amLODIPine 5 mg oral tablet: 1 tab(s) orally once a day  benztropine 1 mg oral tablet: 1 tab(s) orally once a day  carvedilol 25 mg oral tablet: 1 tab(s) orally 2 times a day  docusate 200 mg rectal enema: rectal once a day  latanoprost 0.005% ophthalmic emulsion: 1 drop(s) in each affected eye once a day (at bedtime) both eyes  losartan 25 mg oral tablet: 1 tab(s) orally once a day  Multiple Vitamins oral capsule: 1 cap(s) orally once a day  OLANZapine 10 mg oral tablet: 1 tab(s) orally once a day   acetaminophen 500 mg oral tablet: 2 tab(s) orally every 4 to 6 hours as needed for mild pain  amLODIPine 5 mg oral tablet: 1 tab(s) orally once a day  amLODIPine 5 mg oral tablet: 1 tab(s) orally once a day  amoxicillin-clavulanate 875 mg-125 mg oral tablet: 875 milligram(s) orally every 12 hours MDD: 2 tabs  benztropine 1 mg oral tablet: 1 tab(s) orally once a day  carvedilol 25 mg oral tablet: 1 tab(s) orally 2 times a day  latanoprost 0.005% ophthalmic emulsion: 1 drop(s) in each affected eye once a day (at bedtime) both eyes  losartan 25 mg oral tablet: 1 tab(s) orally once a day  Multiple Vitamins oral capsule: 1 cap(s) orally once a day  Narcan 4 mg/0.1 mL nasal spray: 1 spray(s) intranasally once a day as needed for opiate overdose MDD: 1  OLANZapine 10 mg oral tablet: 1 tab(s) orally once a day  oxyCODONE 5 mg oral tablet: 1 tab(s) orally every 6 hours as needed for  severe pain MDD: 4 tabs  polyethylene glycol 3350 oral powder for reconstitution: 17 gram(s) orally 2 times a day  sodium chloride 0.65% nasal spray: 1 spray(s) nasal 4 times a day MDD: 4 sprays

## 2024-04-25 NOTE — DISCHARGE NOTE PROVIDER - NSDCCPTREATMENT_GEN_ALL_CORE_FT
PRINCIPAL PROCEDURE  Procedure: Endoscopic transphenoidal or transnasal resection of pituitary tumor  Findings and Treatment: S/p transphenoidal resection for pituitary lesion (4/22)

## 2024-04-25 NOTE — PROGRESS NOTE ADULT - SUBJECTIVE AND OBJECTIVE BOX
Patient is a 63y old  Female who presents with a chief complaint of surgery for pituitary mass (25 Apr 2024 11:41)      SUBJECTIVE:  Patient seen and examined at bedside.  Feels well this morning.  Prior headache had resolved.  Tolerating normal PO intake.    ROS:  Denies fevers, chills, headache, vision changes, neck pain, cough, SOB, chest pain, Abdominal pain, N/V, dysuria or new rash.  All other ROS negative except as above    Vital Signs Last 12 Hrs  T(F): 98.2 (04-25-24 @ 08:41), Max: 99.8 (04-25-24 @ 05:00)  HR: 61 (04-25-24 @ 08:41) (57 - 61)  BP: 146/86 (04-25-24 @ 08:41) (146/86 - 155/90)  BP(mean): --  RR: 18 (04-25-24 @ 08:41) (17 - 18)  SpO2: 95% (04-25-24 @ 08:41) (95% - 95%)  I&O's Summary    24 Apr 2024 07:01  -  25 Apr 2024 07:00  --------------------------------------------------------  IN: 600 mL / OUT: 1650 mL / NET: -1050 mL    25 Apr 2024 07:01  -  25 Apr 2024 13:19  --------------------------------------------------------  IN: 360 mL / OUT: 600 mL / NET: -240 mL        PHYSICAL EXAM:  Constitutional: NAD, comfortable in bed.  HEENT: PERRLA, EOMI, no conjunctival pallor or scleral icterus, MMM  Neck: Supple, no JVD  Respiratory: CTA B/L. No w/r/r.   Cardiovascular: RRR, normal S1 and S2, no m/r/g.   Gastrointestinal: +BS, soft NTND, no guarding or rebound tenderness, no palpable masses   Extremities: wwp; no cyanosis, clubbing or edema.   Vascular: Pulses equal and strong throughout.   Neurological: AAOx3, no CN deficits, strength and sensation intact throughout.   Skin: No gross skin abnormalities or rashes        LABS:                        11.8   7.70  )-----------( 237      ( 25 Apr 2024 05:30 )             36.2     04-25    141  |  106  |  14  ----------------------------<  90  4.2   |  26  |  0.88    Ca    9.5      25 Apr 2024 05:30  Phos  3.7     04-25  Mg     2.1     04-25        Urinalysis Basic - ( 25 Apr 2024 05:30 )    Color: x / Appearance: x / SG: x / pH: x  Gluc: 90 mg/dL / Ketone: x  / Bili: x / Urobili: x   Blood: x / Protein: x / Nitrite: x   Leuk Esterase: x / RBC: x / WBC x   Sq Epi: x / Non Sq Epi: x / Bacteria: x          RADIOLOGY & ADDITIONAL TESTS:    MEDICATIONS  (STANDING):  amLODIPine   Tablet 5 milliGRAM(s) Oral daily  benztropine 1 milliGRAM(s) Oral daily  carvedilol 25 milliGRAM(s) Oral every 12 hours  cefTRIAXone   IVPB 2000 milliGRAM(s) IV Intermittent every 12 hours  enoxaparin Injectable 40 milliGRAM(s) SubCutaneous <User Schedule>  latanoprost 0.005% Ophthalmic Solution 1 Drop(s) Both EYES at bedtime  losartan 25 milliGRAM(s) Oral daily  OLANZapine 10 milliGRAM(s) Oral daily  polyethylene glycol 3350 17 Gram(s) Oral two times a day  senna 2 Tablet(s) Oral at bedtime  sodium chloride 0.65% Nasal 2 Spray(s) Both Nostrils every 4 hours    MEDICATIONS  (PRN):  acetaminophen     Tablet .. 650 milliGRAM(s) Oral every 6 hours PRN Temp greater or equal to 38C (100.4F), Mild Pain (1 - 3)  ondansetron Injectable 4 milliGRAM(s) IV Push every 6 hours PRN Nausea and/or Vomiting  oxyCODONE    IR 5 milliGRAM(s) Oral every 4 hours PRN Moderate Pain (4 - 6)  oxyCODONE    IR 10 milliGRAM(s) Oral every 4 hours PRN Severe Pain (7 - 10)   Patient is a 63y old  Female who presents with a chief complaint of surgery for pituitary mass (25 Apr 2024 11:41)      SUBJECTIVE:  Patient seen and examined at bedside.  Feels well this morning.  Prior headache had resolved.  Tolerating normal PO intake.    ROS:  Denies fevers, chills, headache, vision changes, neck pain, cough, SOB, chest pain, Abdominal pain, N/V, dysuria or new rash.  All other ROS negative except as above    Vital Signs Last 12 Hrs  T(F): 98.2 (04-25-24 @ 08:41), Max: 99.8 (04-25-24 @ 05:00)  HR: 61 (04-25-24 @ 08:41) (57 - 61)  BP: 146/86 (04-25-24 @ 08:41) (146/86 - 155/90)  BP(mean): --  RR: 18 (04-25-24 @ 08:41) (17 - 18)  SpO2: 95% (04-25-24 @ 08:41) (95% - 95%)  I&O's Summary    24 Apr 2024 07:01  -  25 Apr 2024 07:00  --------------------------------------------------------  IN: 600 mL / OUT: 1650 mL / NET: -1050 mL    25 Apr 2024 07:01  -  25 Apr 2024 13:19  --------------------------------------------------------  IN: 360 mL / OUT: 600 mL / NET: -240 mL        PHYSICAL EXAM:  Constitutional: NAD, comfortable in bed.  HEENT: PERRLA, EOMI, MMM, mustache dressing with some serosanguinous drainage  Neck: Supple  Respiratory: normal chest rise, normal work of breathing   Gastrointestinal: soft NTND   Extremities: wwp; no cyanosis, clubbing or edema.   Neurological: AAOx3, strength and sensation intact throughout.   Skin: No gross skin abnormalities or rashes        LABS:                        11.8   7.70  )-----------( 237      ( 25 Apr 2024 05:30 )             36.2     04-25    141  |  106  |  14  ----------------------------<  90  4.2   |  26  |  0.88    Ca    9.5      25 Apr 2024 05:30  Phos  3.7     04-25  Mg     2.1     04-25        Urinalysis Basic - ( 25 Apr 2024 05:30 )    Color: x / Appearance: x / SG: x / pH: x  Gluc: 90 mg/dL / Ketone: x  / Bili: x / Urobili: x   Blood: x / Protein: x / Nitrite: x   Leuk Esterase: x / RBC: x / WBC x   Sq Epi: x / Non Sq Epi: x / Bacteria: x          RADIOLOGY & ADDITIONAL TESTS:  No new imaging    MEDICATIONS  (STANDING):  amLODIPine   Tablet 5 milliGRAM(s) Oral daily  benztropine 1 milliGRAM(s) Oral daily  carvedilol 25 milliGRAM(s) Oral every 12 hours  cefTRIAXone   IVPB 2000 milliGRAM(s) IV Intermittent every 12 hours  enoxaparin Injectable 40 milliGRAM(s) SubCutaneous <User Schedule>  latanoprost 0.005% Ophthalmic Solution 1 Drop(s) Both EYES at bedtime  losartan 25 milliGRAM(s) Oral daily  OLANZapine 10 milliGRAM(s) Oral daily  polyethylene glycol 3350 17 Gram(s) Oral two times a day  senna 2 Tablet(s) Oral at bedtime  sodium chloride 0.65% Nasal 2 Spray(s) Both Nostrils every 4 hours    MEDICATIONS  (PRN):  acetaminophen     Tablet .. 650 milliGRAM(s) Oral every 6 hours PRN Temp greater or equal to 38C (100.4F), Mild Pain (1 - 3)  ondansetron Injectable 4 milliGRAM(s) IV Push every 6 hours PRN Nausea and/or Vomiting  oxyCODONE    IR 5 milliGRAM(s) Oral every 4 hours PRN Moderate Pain (4 - 6)  oxyCODONE    IR 10 milliGRAM(s) Oral every 4 hours PRN Severe Pain (7 - 10)

## 2024-04-25 NOTE — PROGRESS NOTE ADULT - SUBJECTIVE AND OBJECTIVE BOX
SUBJECTIVE / INTERVAL HPI: Patient was seen and examined this morning.     Overnight events:  pt seen and examined by bedside  1.650 L urine output for 24 hours  240 ml for the past 12 hours  600 ml from 7am - 10am   Na 141  drinking water to thirst only    REVIEW OF SYSTEMS  Constitutional:  Negative fever, chills   Cardiovascular:  Negative for chest pain or palpitations.  Respiratory:  Negative for cough, wheezing, or shortness of breath.    Gastrointestinal:  Negative for nausea, vomiting, diarrhea, constipation, or abdominal pain.  Genitourinary:  Negative frequency, urgency or dysuria.  Neurologic:  No headache, confusion, dizziness, lightheadedness.    PHYSICAL EXAM  Vital Signs Last 24 Hrs  T(C): 36.8 (25 Apr 2024 08:41), Max: 37.7 (25 Apr 2024 05:00)  T(F): 98.2 (25 Apr 2024 08:41), Max: 99.8 (25 Apr 2024 05:00)  HR: 61 (25 Apr 2024 08:41) (55 - 61)  BP: 146/86 (25 Apr 2024 08:41) (114/72 - 155/90)  BP(mean): --  RR: 18 (25 Apr 2024 08:41) (17 - 18)  SpO2: 95% (25 Apr 2024 08:41) (95% - 96%)    Parameters below as of 25 Apr 2024 08:41  Patient On (Oxygen Delivery Method): room air        Constitutional: Awake, alert, in no acute distress.   HEENT: Normocephalic, atraumatic, LIDIA.  Respiratory: Lungs clear to ausculation bilaterally.   Cardiovascular: regular rhythm, normal S1 and S2, no audible murmurs.   GI: soft, non-tender, non-distended, bowel sounds present.  Extremities: No lower extremity edema.     LABS  CBC - WBC/HGB/HTC/PLT: 7.70/11.8/36.2/237 (04-25-24)  BMP - Na/K/Cl/Bicarb/BUN/Cr/Gluc/AG/eGFR: 141/4.2/106/26/14/0.88/90/9/74 (04-25-24)  Ca - 9.5 (04-25-24)  Phos - 3.7 (04-25-24)  Mg - 2.1 (04-25-24)  LFT - Alb/Tprot/Tbili/Dbili/AlkPhos/ALT/AST: 4.0/--/0.2/--/95/26/18 (04-22-24)  PT/aPTT/INR: 13.2/25.0/1.16 (04-22-24)         04-24-24 @ 07:01  -  04-25-24 @ 07:00  --------------------------------------------------------  IN: 600 mL / OUT: 1650 mL / NET: -1050 mL    04-25-24 @ 07:01  -  04-25-24 @ 13:27  --------------------------------------------------------  IN: 360 mL / OUT: 600 mL / NET: -240 mL        MEDICATIONS  MEDICATIONS  (STANDING):  amLODIPine   Tablet 5 milliGRAM(s) Oral daily  benztropine 1 milliGRAM(s) Oral daily  carvedilol 25 milliGRAM(s) Oral every 12 hours  cefTRIAXone   IVPB 2000 milliGRAM(s) IV Intermittent every 12 hours  enoxaparin Injectable 40 milliGRAM(s) SubCutaneous <User Schedule>  latanoprost 0.005% Ophthalmic Solution 1 Drop(s) Both EYES at bedtime  losartan 25 milliGRAM(s) Oral daily  OLANZapine 10 milliGRAM(s) Oral daily  polyethylene glycol 3350 17 Gram(s) Oral two times a day  senna 2 Tablet(s) Oral at bedtime  sodium chloride 0.65% Nasal 2 Spray(s) Both Nostrils every 4 hours    MEDICATIONS  (PRN):  acetaminophen     Tablet .. 650 milliGRAM(s) Oral every 6 hours PRN Temp greater or equal to 38C (100.4F), Mild Pain (1 - 3)  ondansetron Injectable 4 milliGRAM(s) IV Push every 6 hours PRN Nausea and/or Vomiting  oxyCODONE    IR 5 milliGRAM(s) Oral every 4 hours PRN Moderate Pain (4 - 6)  oxyCODONE    IR 10 milliGRAM(s) Oral every 4 hours PRN Severe Pain (7 - 10)    ASSESSMENT / RECOMMENDATIONS    PERLA MAC is a 63y Female with a past medical history of HTN, bioplar disorder, schizophrenia, pituitary macroadenoma is now s/p endoscopic transphenoidal or transnasal resection of pituitary tumor on 4/22.   A1C: 5.4 %  BUN: 10  Creatinine: 0.69  GFR: 98  Weight: 112.2  BMI: 38.7    # pituitary adenoma s/p resection  - monitoring off steroids  - morning cortisol 04/23: 45.690, 04/24: 19.720  - trend daily morning cortisol  - please strict Is/Os - DI should be cautioned when the urine output >150-200ml/hr for more than 2 consecutive hours  - counselled patient to drink to thirst and to maintain 1.5-2.0 L of water intake per day  - Patient can follow up with Dr. arora at discharge with [facility]    Case discussed with Dr. Abad. Primary team updated.          Roro Patiño  Endocrinology Fellow    Service Pager: [phone]

## 2024-04-25 NOTE — PROGRESS NOTE ADULT - PROVIDER SPECIALTY LIST ADULT
ENT
Hospitalist
Neurosurgery
Neurosurgery
Endocrinology
Endocrinology
ENT
NSICU
ENT
Neurosurgery
Neurosurgery
NSICU
NSICU

## 2024-04-25 NOTE — PROGRESS NOTE ADULT - TIME BILLING
Review of hospital course, labs, vitals, medical records.   Bedside exam and interview    Discussed plan of care with primary team   Documenting the encounter
Review of data (U/O, etc) and hormonal studies, monitoring for signs of DI

## 2024-04-25 NOTE — PROGRESS NOTE ADULT - ASSESSMENT
64yo F with Hx of HTN, anxiety, bipolar/schizophrenia, tremors, glaucoma was incidentally found to have pituitary mass on workup for psychosis in 2022, repeat MRI showed increase in size. S/p elective TSP resection for pituitary lesion (4/22/24). Frozen: pituitary tissue.    #Pituitary lesion  - s/p resection  - management per NSG and ENT primary team  - c/w abx while splints in per ENT, to f/u outpatient  - pain control and bowel regimen  - Endo following for DI watch    #HTN  - c/w Amlodipine 5mg, Coreg 25mg, Losartan 25mg    #Bipolar   - c/w home Olanzapine and benztropine    #Glaucoma  - c/w home eye drops    #Leukocytosis  - WBC 14 post op, likely reactive  - normalized, afebrile, no localizing infectious symptoms    #Morbid obesity  - affects all aspects of care  - outpatient f/u and lifestyle modifications    #DVT ppx: Lovenox

## 2024-04-25 NOTE — PROGRESS NOTE ADULT - ASSESSMENT
PERLA MAC is a 63F PMH shizophrenia, HTN, non functioning pituitary macroadenoma s/p gross total pituitary resection, c/b low flow CSF leak, closed  w duragen, adheris, nasoseptal flap, mucosal graft from MT to septum 4/22.      PLAN:  - lopez splints will remain in place for a total of 2 weeks ( to be removed outpatient )   - Start nasal saline sprays today QID   - skullbase precautions: no nose blowing, sneeze with mouth open, no drinking out of a straw, no straining    - Please have patient on CTX while in house and DC on augmentin   - f/u MRI  - f/u BM      [facility] at [phone] with any questions/concerns.

## 2024-04-25 NOTE — DISCHARGE NOTE PROVIDER - CARE PROVIDER_API CALL
D'Amico, Randy Scott  Neurosurgery  [address] [address]  Phone: [phone]  Fax: [phone]  Follow Up Time:     Gi Foreman  Otolaryngology  [address], [address]  [place] [address]  Phone: [phone]  Fax: [phone]  Follow Up Time:     Mathieu Abad  Endocrinology/Metab/Diabetes  [address] [address]  Phone: [phone]  Fax: [phone]  Follow Up Time:

## 2024-04-25 NOTE — DISCHARGE NOTE PROVIDER - HOSPITAL COURSE
HPI:  63F PMHx HTN, anxiety, bipolar/schizophrenia, tremors, glaucoma was incidentally found to have pituitary mass on workup for psychosis in 2022, repeat MRI showed increase in size. S/p elective TSP resection for pituitary lesion (4/22/24). Frozen: pituitary tissue.    Hospital Course:  4/22: POD 0 TSP resection for pituitary lesion. 1g IV tylenol for HA, oxy 10. Carvedilol x1, weaning cardene.   4/23: POD 1 TSP rsxn for pituitary lesion. PRIYANK overnight. Neuro stable. Na+ 132 from 140 post inc PO intake. Per Dr. Montes, encourage dec PO H2O intake, pending repeat Na+. Cardene gtt dc'd. endo consulted f/u recs.   4/24: POD 2. Na 135 o/n. increased bowel regimen and given lactulose due to straining. Pending MRI. Recommended for home PT/OT. Carvedilol held for systolic 114, HR 55. MRI complete.  4/25: POD 3. PRIYANK overnight, neuro stable. HR 57, systolic 155 amlodpine/losartan given, holding carvedilol.     Patient evaluated by PT/OT who recommended:  Patient is going home? rehab? hospice? Facility Name:     Hospital course c/b:     Exam on day of discharge:    Checklist:   - Obtained follow up appointment from NP  - Reviewed final recommendations of inpatient consults  - review discharge planning on provider handoff  - post op imaging completed  - Neurologically stable for discharge  - Vitals stable for discharge   - Afebrile for discharge  - WBC is stable  - Sodium level is normal  - Pain is adequately controlled  - Pt has PICC/walker/brace/collar   - LACE score (10 or > needs PCP apt)   - stroke patient? Discharge NIHSS score   HPI:  63F PMHx HTN, anxiety, bipolar/schizophrenia, tremors, glaucoma was incidentally found to have pituitary mass on workup for psychosis in 2022, repeat MRI showed increase in size. S/p elective TSP resection for pituitary lesion (4/22/24). Frozen: pituitary tissue.    Hospital Course:  4/22: POD 0 TSP resection for pituitary lesion. 1g IV tylenol for HA, oxy 10. Carvedilol x1, weaning cardene.   4/23: POD 1 TSP rsxn for pituitary lesion. PRIYANK overnight. Neuro stable. Na+ 132 from 140 post inc PO intake. Per Dr. Montes, encourage dec PO H2O intake, pending repeat Na+. Cardene gtt dc'd. endo consulted f/u recs.   4/24: POD 2. Na 135 o/n. increased bowel regimen and given lactulose due to straining. Pending MRI. Recommended for home PT/OT. Carvedilol held for systolic 114, HR 55. MRI complete.  4/25: POD 3. PRIYANK overnight, neuro stable. HR 57, systolic 155 amlodpine/losartan given, holding carvedilol this morning for HR, restart on dc per hospitalist    Patient evaluated by PT/OT who recommended: Home PT/Home OT   Patient is going home with family     Hospital course non complicated     Exam on day of discharge:  General: NAD, pt is comfortably sitting up in bed, on room air  HEENT: CN II-XII intact, PERRL 3mm briskly reactive, EOMI b/l, face symmetric, tongue midline, neck FROM  Cardiovascular: RRR, S1, S2  Respiratory: breathing non-labored on RA, chest rise symmetric  GI: abd soft, NTND   Neuro: A&Ox3, No aphasia, speech clear, no dysmetria, no pronator drift. Follows commands.  SCANLON x4 spontaneously, 5/5 strength in all extremities throughout. Sensation intact  Extremities: distal pulses 2+ x4  Wound/incision: b/l lopez splints in place      Checklist:   - Pt is hemodynamically and neurologically stable for discharge. Pain controlled, afebrile, vitals stable. Home per Dr.D'Amico.

## 2024-04-30 ENCOUNTER — APPOINTMENT (OUTPATIENT)
Dept: NEUROSURGERY | Facility: CLINIC | Age: 64
End: 2024-04-30
Payer: COMMERCIAL

## 2024-04-30 PROCEDURE — 99024 POSTOP FOLLOW-UP VISIT: CPT

## 2024-05-01 DIAGNOSIS — H40.9 UNSPECIFIED GLAUCOMA: ICD-10-CM

## 2024-05-01 DIAGNOSIS — D72.829 ELEVATED WHITE BLOOD CELL COUNT, UNSPECIFIED: ICD-10-CM

## 2024-05-01 DIAGNOSIS — E87.1 HYPO-OSMOLALITY AND HYPONATREMIA: ICD-10-CM

## 2024-05-01 DIAGNOSIS — Z87.891 PERSONAL HISTORY OF NICOTINE DEPENDENCE: ICD-10-CM

## 2024-05-01 DIAGNOSIS — H50.34 INTERMITTENT ALTERNATING EXOTROPIA: ICD-10-CM

## 2024-05-01 DIAGNOSIS — F41.9 ANXIETY DISORDER, UNSPECIFIED: ICD-10-CM

## 2024-05-01 DIAGNOSIS — F20.89 OTHER SCHIZOPHRENIA: ICD-10-CM

## 2024-05-01 DIAGNOSIS — G96.08 OTHER CRANIAL CEREBROSPINAL FLUID LEAK: ICD-10-CM

## 2024-05-01 DIAGNOSIS — R73.03 PREDIABETES: ICD-10-CM

## 2024-05-01 DIAGNOSIS — D35.2 BENIGN NEOPLASM OF PITUITARY GLAND: ICD-10-CM

## 2024-05-01 DIAGNOSIS — H47.42 DISORDERS OF OPTIC CHIASM IN (DUE TO) NEOPLASM: ICD-10-CM

## 2024-05-01 DIAGNOSIS — I10 ESSENTIAL (PRIMARY) HYPERTENSION: ICD-10-CM

## 2024-05-01 DIAGNOSIS — E66.01 MORBID (SEVERE) OBESITY DUE TO EXCESS CALORIES: ICD-10-CM

## 2024-05-02 ENCOUNTER — APPOINTMENT (OUTPATIENT)
Dept: OTOLARYNGOLOGY | Facility: CLINIC | Age: 64
End: 2024-05-02
Payer: COMMERCIAL

## 2024-05-02 DIAGNOSIS — J34.2 DEVIATED NASAL SEPTUM: ICD-10-CM

## 2024-05-02 PROCEDURE — 31237 NSL/SINS NDSC SURG BX POLYPC: CPT | Mod: 50,58

## 2024-05-02 NOTE — PROCEDURE
[FreeTextEntry3] : HISTORY OF PRESENT ILLNESS Ms. Coleman is a pleasant 64 yo lady who is s/p TSA, R NSF repair on 4/22/2024. Currently on NSS. Complaints: congestion   Physical Exam General: AAO x 3, WDWN Ears: Canals clear, TM;s nrml Nose: See endoscopy Throat: uvula midline. No masses or lesions Eyes: PERRL, EOMI Neuro: Gross nrml, CN 2-12 intact Resp: Nrml chest excursion Skin: Appears intact   Procedure Note   PROCEDURE: Nasal/sinus endoscopy, surgical, with debridement (97069-29)   INDICATION: deviated nasal septum, skull base tumor   Prior to the procedure, I had a discussion with the patient regarding the risks, benefits, and alternatives of the debridement and they signed a consent.   SURGEON: Dr. Anthony Foreman   PROCEDURE DETAIL: After obtaining adequate decongestion of the nasal mucosa with ephedrine nasal spray, and adequate anesthesia with 2% topical Lidocaine nasal spray, the nasal endoscope was used to examine the nasal passages and paranasal sinuses. Using a combination of suction, grasping instruments and swabs, the maxillary, ethmoid, frontal and sphenoid sinuses were debrided bilaterally of pus, crust, debris, clots and polyps to prevent scar formation, continued sinusitis and mucocele formation. At the end of this procedure, all operated sinuses were patent. The endoscope was withdrawn, and the patient tolerated the procedure well. No complications were encountered.   INSTRUMENTS: rigid 45   ENDOSCOPIC FINDINGS: doyles removed. crusting and clots removed. completely on the left but partial on the right. no leak.   Impression: doing well   Plan: -Continue nasal irrigations with saline -Return to clinic in 3-4 weeks.   The patient was given an opportunity to ask questions, and all questions asked were answered to the best of my ability.   Anthony Foreman MD

## 2024-05-03 LAB — SURGICAL PATHOLOGY STUDY: SIGNIFICANT CHANGE UP

## 2024-05-06 ENCOUNTER — NON-APPOINTMENT (OUTPATIENT)
Age: 64
End: 2024-05-06

## 2024-05-08 ENCOUNTER — APPOINTMENT (OUTPATIENT)
Dept: NEUROSURGERY | Facility: CLINIC | Age: 64
End: 2024-05-08
Payer: COMMERCIAL

## 2024-05-08 VITALS
WEIGHT: 248 LBS | HEART RATE: 60 BPM | RESPIRATION RATE: 18 BRPM | DIASTOLIC BLOOD PRESSURE: 84 MMHG | OXYGEN SATURATION: 98 % | SYSTOLIC BLOOD PRESSURE: 127 MMHG | BODY MASS INDEX: 38.92 KG/M2 | HEIGHT: 67 IN | TEMPERATURE: 98 F

## 2024-05-08 DIAGNOSIS — E89.3 POSTPROCEDURAL HYPOPITUITARISM: ICD-10-CM

## 2024-05-08 DIAGNOSIS — Z09 ENCOUNTER FOR FOLLOW-UP EXAMINATION AFTER COMPLETED TREATMENT FOR CONDITIONS OTHER THAN MALIGNANT NEOPLASM: ICD-10-CM

## 2024-05-08 DIAGNOSIS — Z87.891 PERSONAL HISTORY OF NICOTINE DEPENDENCE: ICD-10-CM

## 2024-05-08 PROCEDURE — 99212 OFFICE O/P EST SF 10 MIN: CPT

## 2024-05-08 NOTE — HISTORY OF PRESENT ILLNESS
[FreeTextEntry1] : 64 y/o female with PMHx of HTN, anxiety, bipolar/schizophrenia, tremors, glaucoma, incidentally found to have pituitary mass while admitted at [facility] for psychosis Feb 2022. Serial imaging demonstrated progression of lesion, now s/p resection for pituitary lesion 4/22/24 (Frozen: pituitary tissue) with Dr. D'Amico.  In brief: - Feb 2022 Pt found to have pituitary mass during workup at [facility] for psychosis - Following pituitary lesion dx, patient saw endocrinologist Dr. Caesar Thakkar. Endo labs notable for cortisol 23.3. - 11/13/23 repeated MRI brain which showed 2 x 2 x 2 cm right pituitary mass with erosion of the dorsal clivus and extension into the suprasellar cistern. Referred to neuro-opth, endo, to repeat MRI @ 3 months. - 3/15/24 MRI SELLA showed increase of lesion  - 4/22/24 s/p transsphenoidal resection of pituitary lesion. PATH: pituitary neuroendocrine tumor   - 4/25/24 dc home, hospital course uncomplicated  Returns today for post op follow- up.  She saw ENT Dr. Foreman last week. Endorses overall doing well. Denies headaches, dizziness, vision changes, nasal drainage.   ENT: Anthony Foreman Neurologist: Claudia Hightower Endocrinologist: Caesar Thakkar

## 2024-05-08 NOTE — ASSESSMENT
[FreeTextEntry1] : 63F with asymptomatic incidental pituitary adenoma with progressive growth on serial imaging now s/p endoscopic endonasal resection about 2 weeks ago now. Post-operative recovery from endoscopic endonasal approach for resection of a pituitary adenoma appears successful, with full or near-full removal of the tumor, resulting in decompression of the optic nerves. Post-op Pituitary lab panel wnl. The patient will continue to be monitored. An MRI will be scheduled in three months to establish a new baseline, followed by another MRI six months from now, and then annual monitoring. In rare case of tumor re-growth, radiation treatment is a possibility and can avoid further surgery. Continuity of care will be coordinated with an endocrinologist, Dr. Quintanilla, and the primary healthcare provider.  Dr. D'Amico independently reviewed all available images with patient.   PLAN: - F/u with ENT Dr. Foreman as scheduled; follow nasal precautions per Dr. Foreman - F/u with endo Dr. Quintanilla as scheduled  - Repeat MRI 3 months post op (July 2024) - RTC after MRI for review - Continue f/u with PCP and psychiatrist in the interim   Patient verbalizes understanding of today's discussion and next steps in treatment plan.   A total of 15 minutes was spent reviewing the labs, imaging and physical examination of the patient. We discussed the diagnosis, and the plan. The patient's questions were answered. The patient demonstrated an excellent understanding of the plan.

## 2024-05-08 NOTE — DATA REVIEWED
[de-identified] : ACC: [id number] EXAM: MR SELLA ONLY WAW IC ORDERED BY: MEGGAN LIM  PROCEDURE DATE: 04/24/2024    INTERPRETATION: EXAMINATION: MR SELLA WITHOUT AND WITH IV CONTRAST  CLINICAL INDICATION: pituitary/sella protocol s/p TSP TECHNIQUE: Multiplanar MRI images of the sella were obtained before and after IV injection of gadolinium, 10 cc administered. COMPARISON: Pituitary MRI 4/24/2024.   FINDINGS:  Since the prior examination, a transsphenoidal pituitary resection has been performed, with a fat graft and packing material in place. The tumor previously identified in the sella and suprasellar cistern has been resected. A small amount of tissue intimately associated with the right cavernous sinus measuring approximately 6 x 4.1 x 5 mm could represent residual unresectable neoplasm or postsurgical change. Follow-up MRI in 6-12 months is recommended to exclude progression.  The infundibulum remains deviated to the left. There is improved patency of the suprasellar cistern, with excellent decompression of the optic chiasm.  The suprasellar cistern, optic chiasm, carotids, and cavernous sinuses are otherwise negative. The hypothalamus and clivus are within normal limits.  Mild-to-moderate generalized cerebral volume loss, with distention of the sulci and concomitant ex-vacuo ventricular dilatation. Mild nonspecific T2/FLAIR hyperintensity in the periventricular and subcortical white matter. Limited views of the brain parenchyma and extra cranial soft tissues are otherwise unremarkable.     IMPRESSION:  1. Postsurgical changes with excellent decompression of the optic chiasm. 2. Postsurgical change versus mild residual tumor at the right cavernous sinus.    Patient Name: PERLA MAC MRN: [id number], Accession: [id number] DOS: 04/24/24 11:10 PM  --- End of Report ---

## 2024-05-08 NOTE — PHYSICAL EXAM
[General Appearance - Alert] : alert [General Appearance - In No Acute Distress] : in no acute distress [General Appearance - Well-Appearing] : healthy appearing [No Drainage] : without drainage [Oriented To Time, Place, And Person] : oriented to person, place, and time [Impaired Insight] : insight and judgment were intact [Affect] : the affect was normal [Memory Recent] : recent memory was not impaired [Sensation Tactile Decrease] : light touch was intact [Sclera] : the sclera and conjunctiva were normal [PERRL With Normal Accommodation] : pupils were equal in size, round, reactive to light, with normal accommodation [Extraocular Movements] : extraocular movements were intact [Neck Appearance] : the appearance of the neck was normal [] : no respiratory distress [Respiration, Rhythm And Depth] : normal respiratory rhythm and effort [Abnormal Walk] : normal gait [Skin Color & Pigmentation] : normal skin color and pigmentation [FreeTextEntry5] : CN II-XII grossly intact

## 2024-05-14 ENCOUNTER — NON-APPOINTMENT (OUTPATIENT)
Age: 64
End: 2024-05-14

## 2024-05-27 NOTE — PATIENT PROFILE ADULT - FUNCTIONAL ASSESSMENT - DAILY ACTIVITY ASSESSMENT TYPE
HPI:  44 yo M with PMH of previous spinal surgery (osteomyelitis), polysubstance abuse (on methadone for 2 years), depression presents for right knee wound check. Patient fell last week on pavement. He had scrapes on both knees. He states he cleaned it with hydrogen peroxide and put a bandage. The last two days, he found it difficult to ambulate and noticed increased redness of the wound. Denies any current fever, chills, n/v/d, abdominal pain    In the ED, patient had mild leukocytosis, swelling noted on xray.   He was started on clindamycin and given tylenol for pain. (25 May 2024 15:30)  Patient is a 43y old  Male who presents with a chief complaint of cellulitis (26 May 2024 20:33)      INTERVAL HPI/OVERNIGHT EVENTS:    MEDICATIONS  (STANDING):  ampicillin/sulbactam  IVPB 3 Gram(s) IV Intermittent every 6 hours  gabapentin 800 milliGRAM(s) Oral three times a day  methadone    Tablet 10 milliGRAM(s) Oral once  nicotine - 21 mG/24Hr(s) Patch 1 Patch Transdermal daily  vancomycin  IVPB 1500 milliGRAM(s) IV Intermittent every 12 hours    MEDICATIONS  (PRN):  HYDROmorphone  Injectable 2 milliGRAM(s) IV Push every 3 hours PRN Severe Pain (7 - 10)  melatonin 3 milliGRAM(s) Oral at bedtime PRN Sleep      Allergies    Zosyn (Hives)    Intolerances        REVIEW OF SYSTEMS:  CONSTITUTIONAL: No fever, weight loss, or fatigue  EYES: No eye pain, visual disturbances, or discharge  ENMT:  No difficulty hearing, tinnitus, vertigo; No sinus or throat pain  NECK: No pain or stiffness  BREASTS: No pain, masses, or nipple discharge  RESPIRATORY: No cough, wheezing, chills or hemoptysis; No shortness of breath  CARDIOVASCULAR: No chest pain, palpitations, dizziness, or leg swelling  GASTROINTESTINAL: No abdominal or epigastric pain. No nausea, vomiting, or hematemesis; No diarrhea or constipation. No melena or hematochezia.  GENITOURINARY: No dysuria, frequency, hematuria, or incontinence  NEUROLOGICAL: No headaches, memory loss, loss of strength, numbness, or tremors  SKIN: No itching, burning, rashes, or lesions   LYMPH NODES: No enlarged glands  ENDOCRINE: No heat or cold intolerance; No hair loss  MUSCULOSKELETAL: No joint pain or swelling; No muscle, back, or extremity pain  PSYCHIATRIC: No depression, anxiety, mood swings, or difficulty sleeping  HEME/LYMPH: No easy bruising, or bleeding gums  ALLERGY AND IMMUNOLOGIC: No hives or eczema    Vital Signs Last 24 Hrs  T(C): 36.6 (27 May 2024 05:00), Max: 37.1 (26 May 2024 10:26)  T(F): 97.8 (27 May 2024 05:00), Max: 98.7 (26 May 2024 10:26)  HR: 60 (27 May 2024 05:00) (51 - 69)  BP: 102/62 (27 May 2024 05:00) (102/62 - 124/84)  BP(mean): --  RR: 19 (27 May 2024 05:00) (16 - 19)  SpO2: 99% (27 May 2024 05:00) (98% - 99%)    Parameters below as of 27 May 2024 05:00  Patient On (Oxygen Delivery Method): room air        PHYSICAL EXAM:  GENERAL: NAD, well-groomed, well-developed  HEAD:  Atraumatic, Normocephalic  EYES: EOMI, PERRLA, conjunctiva and sclera clear  ENMT: No tonsillar erythema, exudates, or enlargement; Moist mucous membranes, Good dentition, No lesions  NECK: Supple, No JVD, Normal thyroid  NERVOUS SYSTEM:  Alert & Oriented X3, Good concentration; Motor Strength 5/5 B/L upper and lower extremities; DTRs 2+ intact and symmetric  CHEST/LUNG: Clear to ascultation  bilaterally; No rales, rhonchi, wheezing, or rubs  HEART: Regular rate and rhythm; No murmurs, rubs, or gallops  ABDOMEN: Soft, Nontender, Nondistended; Bowel sounds present  EXTREMITIES:  2+ Peripheral Pulses, No clubbing, cyanosis, or edema  LYMPH: No lymphadenopathy noted  SKIN: No rashes or lesions    LABS:                        14.3   6.43  )-----------( 264      ( 27 May 2024 05:15 )             43.8     05-27    137  |  103  |  12  ----------------------------<  83  4.0   |  27  |  0.59    Ca    9.3      27 May 2024 05:15  Phos  4.1     05-27  Mg     2.3     05-27    TPro  8.6<H>  /  Alb  3.8  /  TBili  0.9  /  DBili  x   /  AST  26  /  ALT  28  /  AlkPhos  106  05-25    PT/INR - ( 25 May 2024 12:00 )   PT: 13.5 sec;   INR: 1.13 ratio         PTT - ( 25 May 2024 12:00 )  PTT:34.8 sec  Urinalysis Basic - ( 27 May 2024 05:15 )    Color: x / Appearance: x / SG: x / pH: x  Gluc: 83 mg/dL / Ketone: x  / Bili: x / Urobili: x   Blood: x / Protein: x / Nitrite: x   Leuk Esterase: x / RBC: x / WBC x   Sq Epi: x / Non Sq Epi: x / Bacteria: x      CAPILLARY BLOOD GLUCOSE          RADIOLOGY & ADDITIONAL TESTS:    Imaging Personally Reviewed:  [ ] YES  [ ] NO    Consultant(s) Notes Reviewed:  [ ] YES  [ ] NO    Care Discussed with Consultants/Other Providers [ ] YES  [ ] NO HPI:  42 yo M with PMH of previous spinal surgery (osteomyelitis), polysubstance abuse (on methadone for 2 years), depression presents for right knee wound check. Patient fell last week on pavement. He had scrapes on both knees. He states he cleaned it with hydrogen peroxide and put a bandage. The last two days, he found it difficult to ambulate and noticed increased redness of the wound. Denies any current fever, chills, n/v/d, abdominal pain    In the ED, patient had mild leukocytosis, swelling noted on xray.   He was started on clindamycin and given tylenol for pain. (25 May 2024 15:30)  Patient is a 43y old  Male who presents with a chief complaint of cellulitis (26 May 2024 20:33)      INTERVAL HPI/OVERNIGHT EVENTS: no acute events still needing pain meds     MEDICATIONS  (STANDING):  ampicillin/sulbactam  IVPB 3 Gram(s) IV Intermittent every 6 hours  gabapentin 800 milliGRAM(s) Oral three times a day  methadone    Tablet 10 milliGRAM(s) Oral once  nicotine - 21 mG/24Hr(s) Patch 1 Patch Transdermal daily  vancomycin  IVPB 1500 milliGRAM(s) IV Intermittent every 12 hours    MEDICATIONS  (PRN):  HYDROmorphone  Injectable 2 milliGRAM(s) IV Push every 3 hours PRN Severe Pain (7 - 10)  melatonin 3 milliGRAM(s) Oral at bedtime PRN Sleep      Allergies    Zosyn (Hives)    Intolerances        REVIEW OF SYSTEMS:  CONSTITUTIONAL: No fever, weight loss, or fatigue  EYES: No eye pain, visual disturbances, or discharge  ENMT:  No difficulty hearing, tinnitus, vertigo; No sinus or throat pain  NECK: No pain or stiffness  BREASTS: No pain, masses, or nipple discharge  RESPIRATORY: No cough, wheezing, chills or hemoptysis; No shortness of breath  CARDIOVASCULAR: No chest pain, palpitations, dizziness, or leg swelling  GASTROINTESTINAL: No abdominal or epigastric pain. No nausea, vomiting, or hematemesis; No diarrhea or constipation. No melena or hematochezia.  GENITOURINARY: No dysuria, frequency, hematuria, or incontinence  NEUROLOGICAL: No headaches, memory loss, loss of strength, numbness, or tremors  SKIN: No itching, burning, rashes, or lesions   LYMPH NODES: No enlarged glands  ENDOCRINE: No heat or cold intolerance; No hair loss  MUSCULOSKELETAL: No joint pain or swelling; No muscle, back, or extremity pain  PSYCHIATRIC: No depression, anxiety, mood swings, or difficulty sleeping  HEME/LYMPH: No easy bruising, or bleeding gums  ALLERGY AND IMMUNOLOGIC: No hives or eczema    Vital Signs Last 24 Hrs  T(C): 36.6 (27 May 2024 05:00), Max: 37.1 (26 May 2024 10:26)  T(F): 97.8 (27 May 2024 05:00), Max: 98.7 (26 May 2024 10:26)  HR: 60 (27 May 2024 05:00) (51 - 69)  BP: 102/62 (27 May 2024 05:00) (102/62 - 124/84)  BP(mean): --  RR: 19 (27 May 2024 05:00) (16 - 19)  SpO2: 99% (27 May 2024 05:00) (98% - 99%)    Parameters below as of 27 May 2024 05:00  Patient On (Oxygen Delivery Method): room air        PHYSICAL EXAM:  GENERAL: NAD, well-groomed, well-developed  HEAD:  Atraumatic, Normocephalic  EYES: EOMI, PERRLA, conjunctiva and sclera clear  ENMT: No tonsillar erythema, exudates, or enlargement; Moist mucous membranes, Good dentition, No lesions  NECK: Supple, No JVD, Normal thyroid  NERVOUS SYSTEM:  Alert & Oriented X3, Good concentration; Motor Strength 5/5 B/L upper and lower extremities; DTRs 2+ intact and symmetric  CHEST/LUNG: Clear to ascultation  bilaterally; No rales, rhonchi, wheezing, or rubs  HEART: Regular rate and rhythm; No murmurs, rubs, or gallops  ABDOMEN: Soft, Nontender, Nondistended; Bowel sounds present  EXTREMITIES:  2+ Peripheral Pulses, No clubbing, cyanosis, or edema  LYMPH: No lymphadenopathy noted  SKIN: righ leg above the knee abscess draining   LABS:                        14.3   6.43  )-----------( 264      ( 27 May 2024 05:15 )             43.8     05-27    137  |  103  |  12  ----------------------------<  83  4.0   |  27  |  0.59    Ca    9.3      27 May 2024 05:15  Phos  4.1     05-27  Mg     2.3     05-27    TPro  8.6<H>  /  Alb  3.8  /  TBili  0.9  /  DBili  x   /  AST  26  /  ALT  28  /  AlkPhos  106  05-25    PT/INR - ( 25 May 2024 12:00 )   PT: 13.5 sec;   INR: 1.13 ratio         PTT - ( 25 May 2024 12:00 )  PTT:34.8 sec  Urinalysis Basic - ( 27 May 2024 05:15 )    Color: x / Appearance: x / SG: x / pH: x  Gluc: 83 mg/dL / Ketone: x  / Bili: x / Urobili: x   Blood: x / Protein: x / Nitrite: x   Leuk Esterase: x / RBC: x / WBC x   Sq Epi: x / Non Sq Epi: x / Bacteria: x      CAPILLARY BLOOD GLUCOSE          RADIOLOGY & ADDITIONAL TESTS:    Imaging Personally Reviewed:  [ ] YES  [ ] NO    Consultant(s) Notes Reviewed:  [ ] YES  [ ] NO    Care Discussed with Consultants/Other Providers [ ] YES  [ ] NO Admission

## 2024-05-30 ENCOUNTER — APPOINTMENT (OUTPATIENT)
Dept: OTOLARYNGOLOGY | Facility: CLINIC | Age: 64
End: 2024-05-30
Payer: COMMERCIAL

## 2024-05-30 DIAGNOSIS — E23.7 DISORDER OF PITUITARY GLAND, UNSPECIFIED: ICD-10-CM

## 2024-05-30 PROCEDURE — 31237 NSL/SINS NDSC SURG BX POLYPC: CPT | Mod: 50,58

## 2024-05-31 NOTE — REASON FOR VISIT
[Home] : at home, [unfilled] , at the time of the visit. [Medical Office: ([facility])___] : at the medical office located in  [Verbal consent obtained from patient] : the patient, [unfilled] [de-identified] : transsphenoidal resection of pituitary lesion [de-identified] : 4/22/24

## 2024-05-31 NOTE — DATA REVIEWED
[de-identified] :    ACC: [id number] EXAM: MR SELLA ONLY WAW IC ORDERED BY: MEGGAN LIM  PROCEDURE DATE: 04/24/2024    INTERPRETATION: EXAMINATION: MR SELLA WITHOUT AND WITH IV CONTRAST  CLINICAL INDICATION: pituitary/sella protocol s/p TSP TECHNIQUE: Multiplanar MRI images of the sella were obtained before and after IV injection of gadolinium, 10 cc administered. COMPARISON: Pituitary MRI 4/24/2024.   FINDINGS:  Since the prior examination, a transsphenoidal pituitary resection has been performed, with a fat graft and packing material in place. The tumor previously identified in the sella and suprasellar cistern has been resected. A small amount of tissue intimately associated with the right cavernous sinus measuring approximately 6 x 4.1 x 5 mm could represent residual unresectable neoplasm or postsurgical change. Follow-up MRI in 6-12 months is recommended to exclude progression.  The infundibulum remains deviated to the left. There is improved patency of the suprasellar cistern, with excellent decompression of the optic chiasm.  The suprasellar cistern, optic chiasm, carotids, and cavernous sinuses are otherwise negative. The hypothalamus and clivus are within normal limits.  Mild-to-moderate generalized cerebral volume loss, with distention of the sulci and concomitant ex-vacuo ventricular dilatation. Mild nonspecific T2/FLAIR hyperintensity in the periventricular and subcortical white matter. Limited views of the brain parenchyma and extra cranial soft tissues are otherwise unremarkable.     IMPRESSION:  1. Postsurgical changes with excellent decompression of the optic chiasm. 2. Postsurgical change versus mild residual tumor at the right cavernous sinus.    Patient Name: PERLA MAC MRN: [id number], Accession: [id number] DOS: 04/24/24 11:10 PM  --- End of Report ---

## 2024-05-31 NOTE — HISTORY OF PRESENT ILLNESS
[FreeTextEntry1] : 62 y/o female with PMHx of HTN, anxiety, bipolar/schizophrenia, tremors, glaucoma, incidentally found to have pituitary mass while admitted at [facility] for psychosis Feb 2022. Serial imaging demonstrated progression of lesion, now s/p resection for pituitary lesion 4/22/24 (Frozen: pituitary tissue) with Dr. D'Amico.   In brief:  - Feb 2022 Pt found to have pituitary mass during workup at [facility] for psychosis  - Following pituitary lesion dx, patient saw endocrinologist Dr. Caesar Thakkar. Endo labs notable for cortisol 23.3. - 11/13/23 repeated MRI brain which showed 2 x 2 x 2 cm right pituitary mass with erosion of the dorsal clivus and extension into the suprasellar cistern. Referred to neuro-opth, aravind, to repeat MRI @ 3 months.  - 3/15/24 MRI SELLA showed increase of llesion  - 4/22/24 s/p transsphenoidal resection of pituitary lesion.  Final path pending  - 4/25/24 dc home, hospital course uncomplicated   Returns today via telephone for check in. She endorses overall recovering well.  Denies headaches, dizziness, nasal drainage.  Denies excessive thirst or urine output.   ENT: Anthony Foreman Neurologist: Claudia Hightower Endocrinologist: Caesar Thakkar

## 2024-05-31 NOTE — ASSESSMENT
[FreeTextEntry1] : 62 y/o female with PMHx of HTN, anxiety, bipolar/schizophrenia, tremors, glaucoma, incidentally found to have pituitary mass while admitted at [facility] for psychosis Feb 2022. Serial imaging demonstrated progression of lesion, now s/p resection for pituitary lesion 4/22/24 with Dr. D'Amico and ENT Dr. Foreman. Path pending.   Returned today for post op check in via telephone visit. Without complaints and endorsed overall recovering well.  Denied headaches, dizziness, nasal drainage.   She is scheduled for f/u with ENT Dr. Foreman 5/2 and for in person follow- up with Dr. D'Amico 5/8.   Educated to notify if any nasal drainage, increased urine output, changes in mental status.   Patient verbalizes understanding of todays discussion and next steps in treatment plan.

## 2024-06-01 PROBLEM — E23.7 PITUITARY LESION: Status: ACTIVE | Noted: 2023-05-30

## 2024-06-01 NOTE — PROCEDURE
[FreeTextEntry3] : HISTORY OF PRESENT ILLNESS Ms. Coleman is a pleasant 62 yo lady who is s/p TSA, R NSF repair on 4/22/2024. Currently on NSI. Complaints: feeling much better   Physical Exam General: AAO x 3, WDWN Ears: Canals clear, TM;s nrml Nose: See endoscopy Throat: uvula midline. No masses or lesions Eyes: PERRL, EOMI Neuro: Gross nrml, CN 2-12 intact Resp: Nrml chest excursion Skin: Appears intact   Procedure Note   PROCEDURE: Nasal/sinus endoscopy, surgical, with debridement (17516-25)   INDICATION: deviated nasal septum, skull base tumor   Prior to the procedure, I had a discussion with the patient regarding the risks, benefits, and alternatives of the debridement and they signed a consent.   SURGEON: Dr. Anthony Foreman   PROCEDURE DETAIL: After obtaining adequate decongestion of the nasal mucosa with ephedrine nasal spray, and adequate anesthesia with 2% topical Lidocaine nasal spray, the nasal endoscope was used to examine the nasal passages and paranasal sinuses. Using a combination of suction, grasping instruments and swabs, the maxillary, ethmoid, frontal and sphenoid sinuses were debrided bilaterally of pus, crust, debris, clots and polyps to prevent scar formation, continued sinusitis and mucocele formation. At the end of this procedure, all operated sinuses were patent. The endoscope was withdrawn, and the patient tolerated the procedure well. No complications were encountered.   INSTRUMENTS: rigid 45   ENDOSCOPIC FINDINGS: crusting much improved, debrided from the sphenoid. left side is well healed.   Impression: doing well   Plan: -Continue nasal irrigations with saline  -Return to clinic in 4-8 weeks   The patient was given an opportunity to ask questions, and all questions asked were answered to the best of my ability.   Anthony Foreman MD

## 2024-06-24 ENCOUNTER — APPOINTMENT (OUTPATIENT)
Dept: OPHTHALMOLOGY | Facility: CLINIC | Age: 64
End: 2024-06-24

## 2024-06-25 ENCOUNTER — APPOINTMENT (OUTPATIENT)
Dept: OTOLARYNGOLOGY | Facility: CLINIC | Age: 64
End: 2024-06-25

## 2024-08-12 ENCOUNTER — RX RENEWAL (OUTPATIENT)
Age: 64
End: 2024-08-12

## 2024-08-17 NOTE — PRE-ANESTHESIA EVALUATION ADULT - NSANTHTIREDRD_ENT_A_CORE
Patient presents with intractable nausea and vomiting  Patient reports no improvement but clinically appears more well at the bedside.  She did have a bowel movement today.  She tolerated some crackers and fluids.  With patient's new regional wall motion abnormalities, not safe for endoscopy at this time.  Can be pursued as an outpatient when patient is stable on goal-directed medical therapy  Did not tolerate gastric emptying study  Follow-up small bowel follow-through  Appreciate psychiatry recommendations-given severity of patient's anxiety, recommendation was given for addition of as needed benzodiazepines.    She was initiated on clonazepam 0.5 mg twice daily with significant improvement in mood and nausea  Continue scheduled Tigan, IV Protonix, clonazepam  Small frequent meals  If tolerating diet over the next 24 hours and electrolytes stable consider discharge home   Yes

## 2024-08-26 ENCOUNTER — OUTPATIENT (OUTPATIENT)
Dept: OUTPATIENT SERVICES | Facility: HOSPITAL | Age: 64
LOS: 1 days | End: 2024-08-26
Payer: COMMERCIAL

## 2024-08-26 ENCOUNTER — APPOINTMENT (OUTPATIENT)
Dept: MRI IMAGING | Facility: HOSPITAL | Age: 64
End: 2024-08-26

## 2024-08-26 DIAGNOSIS — Z98.891 HISTORY OF UTERINE SCAR FROM PREVIOUS SURGERY: Chronic | ICD-10-CM

## 2024-08-26 PROCEDURE — 70553 MRI BRAIN STEM W/O & W/DYE: CPT

## 2024-08-26 PROCEDURE — A9585: CPT

## 2024-08-26 PROCEDURE — 70553 MRI BRAIN STEM W/O & W/DYE: CPT | Mod: 26

## 2024-08-28 ENCOUNTER — APPOINTMENT (OUTPATIENT)
Dept: ENDOCRINOLOGY | Facility: CLINIC | Age: 64
End: 2024-08-28
Payer: COMMERCIAL

## 2024-08-28 VITALS
HEART RATE: 56 BPM | DIASTOLIC BLOOD PRESSURE: 87 MMHG | SYSTOLIC BLOOD PRESSURE: 149 MMHG | BODY MASS INDEX: 37.12 KG/M2 | WEIGHT: 237 LBS

## 2024-08-28 DIAGNOSIS — E89.3 POSTPROCEDURAL HYPOPITUITARISM: ICD-10-CM

## 2024-08-28 PROCEDURE — 36415 COLL VENOUS BLD VENIPUNCTURE: CPT

## 2024-08-28 PROCEDURE — 99215 OFFICE O/P EST HI 40 MIN: CPT | Mod: 25

## 2024-08-29 LAB
ACTH SER-ACNC: 12.9 PG/ML
ALBUMIN SERPL ELPH-MCNC: 4.3 G/DL
ALP BLD-CCNC: 117 U/L
ALT SERPL-CCNC: 34 U/L
ANION GAP SERPL CALC-SCNC: 11 MMOL/L
AST SERPL-CCNC: 30 U/L
BILIRUB SERPL-MCNC: 0.4 MG/DL
BUN SERPL-MCNC: 13 MG/DL
CALCIUM SERPL-MCNC: 10 MG/DL
CHLORIDE SERPL-SCNC: 107 MMOL/L
CO2 SERPL-SCNC: 24 MMOL/L
CORTIS SERPL-MCNC: 10.2 UG/DL
CREAT SERPL-MCNC: 0.85 MG/DL
EGFR: 77 ML/MIN/1.73M2
FSH SERPL-MCNC: 31.5 IU/L
GLUCOSE SERPL-MCNC: 92 MG/DL
POTASSIUM SERPL-SCNC: 4.6 MMOL/L
PROLACTIN SERPL-MCNC: 19.2 NG/ML
PROT SERPL-MCNC: 7 G/DL
SODIUM SERPL-SCNC: 142 MMOL/L
T4 FREE SERPL-MCNC: 1.2 NG/DL
TSH SERPL-ACNC: 1.23 UIU/ML

## 2024-08-30 NOTE — HISTORY OF PRESENT ILLNESS
[FreeTextEntry1] : 64 year old F pt, with Hx of pituitary lesion found incidentally in , referred by Dr. Gema Walsh, presents today to establish endocrine care.  Other PMHx: HTN, Anxiety, Glaucoma, Bipolar/Schizophrenia  No surgical history, No thyroid conditions, no liver disease, no bone fractures. Denies antipsychotic medication use in  -  No COVID Infections FHx: Mother: Breast CA  No FHx of: Thyroid Issues, pituitary issues  SHx: Stopped smoking 2 yrs ago, Drinks wine occasionally Lifestyle: Wakes up 5:30 am, sleeps 9 pm. She drinks a lot of water:   PCP: Dr. Gema Walsh (Phone # [phone], Fax # [phone])   NKDA   Sees psychiatrist since . (Drug induced Psychotic break: PCP ~ 15 yrs ago)  Two pregnancies that were two : age 24 and age 29.  Flu and COVID Vaccines   2023  Pt is here for today for endocrine visit. No previous medical record seen. Referral indicates pt is here due to Pituitary lesion and Substance-induced psychotic disorder.   Pt states pituitary lesion was found incidentally in [facility] when she was in the psych [place]ezqu[place] due to a psychotic break in  from a " bad batch of gummies "  as per pt. Blood tests and an X-Ray of her brain was done. She notes an additional psychotics break on . During these episodes, she reports she was hearing things and was not functioning well.  Endorses polyuria for ~ 5 yrs, which worsened 6 months ago, and nocturia: 2x after midnight. Prior, she had nocturia once every hr. Pt reports she was rx Oxybutynin by her Emergency Specialist, and says her polyuria has improved. She has seen an urologist, who said pt should continue to take Oxybutynin. In addition, pt endorses feeling off balance, occasional R hand tremors that occurs anytime since .  Denies HAs.    2023 CC: "I'm feeling fine" Physical complaints other than joints pain. She was prescribed oxybutynin however she continues with frequent urination, no fever, nor dysuria  2024  Pt has /87 and BMI 37.12. No significant weight change.  CC: "I've been alright since the operation. I've been having no problems and everything is well." Underwent pituitary resection 24. Pt follows up with her psychiatrist monthly. She denies headaches, galactorrhea, fainting episodes, and blurred vision.  Pt states that she completed some blood tests a week ago with her PCP.   [Medications verified as per pt on 2024] Current Medications: Carvedilol, Amlodipine 5 mg, Losartan Potassium 25 mg, Olanzapine (Rx ~ ), Oxybutynin, Cogentin

## 2024-08-30 NOTE — END OF VISIT
[Time Spent: ___ minutes] : I have spent [unfilled] minutes of time on the encounter which excludes teaching and separately reported services. [FreeTextEntry3] :  All medical record entries made by the Scribe were at my, Dr. Caesar Quintanilla, direction and personally dictated by me on 08/28/2024. I have reviewed the chart and agree that the record accurately reflects my personal performance of the history, physical exam, assessment and plan. I have also personally directed, reviewed and agreed with the chart.

## 2024-08-30 NOTE — REVIEW OF SYSTEMS
[Negative] : Heme/Lymph [Visual Field Defect] : no visual field defect [Blurred Vision] : no blurred vision [Headaches] : no headaches [Galactorrhea] : no galactorrhea

## 2024-08-30 NOTE — ADDENDUM
[FreeTextEntry1] : I, Nick You act solely as a scribe for Dr. Caesar Quintanilla on this date 08/28/2024

## 2024-08-30 NOTE — PHYSICAL EXAM
[No Acute Distress] : no acute distress [Normal Sclera/Conjunctiva] : normal sclera/conjunctiva [No Proptosis] : no proptosis [Normal Oropharynx] : the oropharynx was normal [Thyroid Not Enlarged] : the thyroid was not enlarged [No Thyroid Nodules] : no palpable thyroid nodules [Clear to Auscultation] : lungs were clear to auscultation bilaterally [Normal Rate] : heart rate was normal [No Edema] : no peripheral edema [Pedal Pulses Normal] : the pedal pulses are present [Soft] : abdomen soft [No Spinal Tenderness] : no spinal tenderness [Spine Straight] : spine straight [No Stigmata of Cushings Syndrome] : no stigmata of Cushings Syndrome [Normal Gait] : normal gait [Normal Strength/Tone] : muscle strength and tone were normal [No Rash] : no rash [Normal Reflexes] : deep tendon reflexes were 2+ and symmetric [No Tremors] : no tremors [Oriented x3] : oriented to person, place, and time [Normal Outer Ear/Nose] : the ears and nose were normal in appearance [Acanthosis Nigricans] : no acanthosis nigricans [de-identified] : No buffalo hump

## 2024-08-30 NOTE — PHYSICAL EXAM
[No Acute Distress] : no acute distress [Normal Sclera/Conjunctiva] : normal sclera/conjunctiva [No Proptosis] : no proptosis [Normal Oropharynx] : the oropharynx was normal [Thyroid Not Enlarged] : the thyroid was not enlarged [No Thyroid Nodules] : no palpable thyroid nodules [Clear to Auscultation] : lungs were clear to auscultation bilaterally [Normal Rate] : heart rate was normal [No Edema] : no peripheral edema [Pedal Pulses Normal] : the pedal pulses are present [Soft] : abdomen soft [No Spinal Tenderness] : no spinal tenderness [Spine Straight] : spine straight [No Stigmata of Cushings Syndrome] : no stigmata of Cushings Syndrome [Normal Gait] : normal gait [Normal Strength/Tone] : muscle strength and tone were normal [No Rash] : no rash [Normal Reflexes] : deep tendon reflexes were 2+ and symmetric [No Tremors] : no tremors [Oriented x3] : oriented to person, place, and time [Normal Outer Ear/Nose] : the ears and nose were normal in appearance [Acanthosis Nigricans] : no acanthosis nigricans [de-identified] : No buffalo hump

## 2024-08-30 NOTE — DATA REVIEWED
[FreeTextEntry1] : Scanned Labs:\par  - 06/03/23: LDL-c 162, S.creat 0.81, cortisol 23.3, Free T4 1.1, TSH 1.35, Prolactin 20.8, ACTH 24

## 2024-08-30 NOTE — HISTORY OF PRESENT ILLNESS
[FreeTextEntry1] : 64 year old F pt, with Hx of pituitary lesion found incidentally in , referred by Dr. Gema Walsh, presents today to establish endocrine care.  Other PMHx: HTN, Anxiety, Glaucoma, Bipolar/Schizophrenia  No surgical history, No thyroid conditions, no liver disease, no bone fractures. Denies antipsychotic medication use in  -  No COVID Infections FHx: Mother: Breast CA  No FHx of: Thyroid Issues, pituitary issues  SHx: Stopped smoking 2 yrs ago, Drinks wine occasionally Lifestyle: Wakes up 5:30 am, sleeps 9 pm. She drinks a lot of water:   PCP: Dr. Gema Walsh (Phone # [phone], Fax # [phone])   NKDA   Sees psychiatrist since . (Drug induced Psychotic break: PCP ~ 15 yrs ago)  Two pregnancies that were two : age 24 and age 29.  Flu and COVID Vaccines   2023  Pt is here for today for endocrine visit. No previous medical record seen. Referral indicates pt is here due to Pituitary lesion and Substance-induced psychotic disorder.   Pt states pituitary lesion was found incidentally in [facility] when she was in the psych [place] due to a psychotic break in  from a " bad batch of gummies "  as per pt. Blood tests and an X-Ray of her brain was done. She notes an additional psychotics break on . During these episodes, she reports she was hearing things and was not functioning well.  Endorses polyuria for ~ 5 yrs, which worsened 6 months ago, and nocturia: 2x after midnight. Prior, she had nocturia once every hr. Pt reports she was rx Oxybutynin by her Emergency Specialist, and says her polyuria has improved. She has seen an urologist, who said pt should continue to take Oxybutynin. In addition, pt endorses feeling off balance, occasional R hand tremors that occurs anytime since .  Denies HAs.    2023 CC: "I'm feeling fine" Physical complaints other than joints pain. She was prescribed oxybutynin however she continues with frequent urination, no fever, nor dysuria  2024  Pt has /87 and BMI 37.12. No significant weight change.  CC: "I've been alright since the operation. I've been having no problems and everything is well." Underwent pituitary resection 24. Pt follows up with her psychiatrist monthly. She denies headaches, galactorrhea, fainting episodes, and blurred vision.  Pt states that she completed some blood tests a week ago with her PCP.   [Medications verified as per pt on 2024] Current Medications: Carvedilol, Amlodipine 5 mg, Losartan Potassium 25 mg, Olanzapine (Rx ~ ), Oxybutynin, Cogentin

## 2024-08-30 NOTE — HISTORY OF PRESENT ILLNESS
[FreeTextEntry1] : 64 year old F pt, with Hx of pituitary lesion found incidentally in , referred by Dr. Gema Walsh, presents today to establish endocrine care.  Other PMHx: HTN, Anxiety, Glaucoma, Bipolar/Schizophrenia  No surgical history, No thyroid conditions, no liver disease, no bone fractures. Denies antipsychotic medication use in  -  No COVID Infections FHx: Mother: Breast CA  No FHx of: Thyroid Issues, pituitary issues  SHx: Stopped smoking 2 yrs ago, Drinks wine occasionally Lifestyle: Wakes up 5:30 am, sleeps 9 pm. She drinks a lot of water:   PCP: Dr. Gema Walsh (Phone # [phone], Fax # [phone])   NKDA   Sees psychiatrist since . (Drug induced Psychotic break: PCP ~ 15 yrs ago)  Two pregnancies that were two : age 24 and age 29.  Flu and COVID Vaccines   2023  Pt is here for today for endocrine visit. No previous medical record seen. Referral indicates pt is here due to Pituitary lesion and Substance-induced psychotic disorder.   Pt states pituitary lesion was found incidentally in [facility] when she was in the psych [place]quita due to a psychotic break in  from a " bad batch of gummies "  as per pt. Blood tests and an X-Ray of her brain was done. She notes an additional psychotics break on . During these episodes, she reports she was hearing things and was not functioning well.  Endorses polyuria for ~ 5 yrs, which worsened 6 months ago, and nocturia: 2x after midnight. Prior, she had nocturia once every hr. Pt reports she was rx Oxybutynin by her Emergency Specialist, and says her polyuria has improved. She has seen an urologist, who said pt should continue to take Oxybutynin. In addition, pt endorses feeling off balance, occasional R hand tremors that occurs anytime since .  Denies HAs.    2023 CC: "I'm feeling fine" Physical complaints other than joints pain. She was prescribed oxybutynin however she continues with frequent urination, no fever, nor dysuria  2024  Pt has /87 and BMI 37.12. No significant weight change.  CC: "I've been alright since the operation. I've been having no problems and everything is well." Underwent pituitary resection 24. Pt follows up with her psychiatrist monthly. She denies headaches, galactorrhea, fainting episodes, and blurred vision.  Pt states that she completed some blood tests a week ago with her PCP.   [Medications verified as per pt on 2024] Current Medications: Carvedilol, Amlodipine 5 mg, Losartan Potassium 25 mg, Olanzapine (Rx ~ ), Oxybutynin, Cogentin

## 2024-08-30 NOTE — PHYSICAL EXAM
[No Acute Distress] : no acute distress [Normal Sclera/Conjunctiva] : normal sclera/conjunctiva [No Proptosis] : no proptosis [Normal Oropharynx] : the oropharynx was normal [Thyroid Not Enlarged] : the thyroid was not enlarged [No Thyroid Nodules] : no palpable thyroid nodules [Clear to Auscultation] : lungs were clear to auscultation bilaterally [Normal Rate] : heart rate was normal [No Edema] : no peripheral edema [Pedal Pulses Normal] : the pedal pulses are present [Soft] : abdomen soft [No Spinal Tenderness] : no spinal tenderness [Spine Straight] : spine straight [No Stigmata of Cushings Syndrome] : no stigmata of Cushings Syndrome [Normal Gait] : normal gait [Normal Strength/Tone] : muscle strength and tone were normal [No Rash] : no rash [Normal Reflexes] : deep tendon reflexes were 2+ and symmetric [No Tremors] : no tremors [Oriented x3] : oriented to person, place, and time [Normal Outer Ear/Nose] : the ears and nose were normal in appearance [Acanthosis Nigricans] : no acanthosis nigricans [de-identified] : No buffalo hump

## 2024-09-24 NOTE — ASSESSMENT
[FreeTextEntry1] : Dr. D'Amico independently reviewed all available images with patient.   PLAN: - F/u with endo Dr. Quintanilla as scheduled  - Repeat MRI in ____ - RTC after MRI for review - Continue f/u with PCP and psychiatrist in the interim   Patient verbalizes understanding of today's discussion and next steps in treatment plan.

## 2024-09-24 NOTE — HISTORY OF PRESENT ILLNESS
[FreeTextEntry1] : 64 y/o female with PMHx of HTN, anxiety, bipolar/schizophrenia, tremors, glaucoma, incidentally found to have pituitary mass [facility]. Serial imaging demonstrated progression of lesion, now s/p resection for pituitary lesion 4/22/24 (Frozen: pituitary tissue) with Dr. D'Amico.  In brief: - Feb 2022 Pt found to have pituitary mass during workup at [facility] for [facility] - Following pituitary lesion dx, patient saw endocrinologist Dr. Caesar Thakkar. Endo labs notable for cortisol 23.3. - 11/13/23 repeated MRI brain which showed 2 x 2 x 2 cm right pituitary mass with erosion of the dorsal clivus and extension into the suprasellar cistern. Referred to neuro-opth, aravind, to repeat MRI @ 3 months. - 3/15/24 MRI SELLA showed increase of lesion  - 4/22/24 s/p transsphenoidal resection of pituitary lesion. PATH: pituitary neuroendocrine tumor   - 4/25/24 dc home, hospital course uncomplicated  5/8/24: Returns for post op follow- up.  She saw ENT Dr. Foreman last week. Endorses overall doing well. Denies headaches, dizziness, vision changes, nasal drainage.   Returns TODAY for follow up to review MRI. Endo labs done 8/28/24 WNL  ENT: Anthony Foreman Neurologist: Claudia Hightower Endocrinologist: Caesar Thakkar

## 2024-09-24 NOTE — DATA REVIEWED
[de-identified] : ACC: [id number]     EXAM:  MR BRAIN WAW IC   ORDERED BY: LYDIA GERMAIN  PROCEDURE DATE:  08/26/2024    INTERPRETATION:  Clinical indications: Status post surgery.  MRI of the sella turcica was performed using coronal and sagittal T1-weighted sequence. Coronal T2-weighted sequence was performed as well. Axial T2 FLAIR weighted sequence performed through the brain. The patient was injected with approximately 12 cc of gadavist IV with 3 cc contrast discarded. Coronal sagittal T1 weighted sequence was performed through the sella turcica. Axial T1 weighted sequence was performed through the brain.  This exam is compared with prior MRI sella turcica performed on April 24, 2024  Postoperative changes compatible transphenoidal surgery is again seen.  Previously noted area of decreased enhancement in the pituitary gland region has decreased in size with some residual area of enhancement seen centrally and to the right. This finding measures approximately 1.2 x 0.1 cm and previously measured approximately 1.6 x 1.0 cm. These findings are likely compatible with evolving postop changes though continued close interval follow-up can be done.  Pituitary stalk is centrally located and appears normal  Evaluation of surrounding sella structures including the optic chiasm and internal carotid arteries appear normal.  Elevation of brain parenchyma demonstrates parenchymal volume loss and chronic microvascular ischemic changes.  No abnormal areas of enhancement seen. Mucosal thickening is seen involving right ethmoid and bilateral sphenoid sinus. Inflammatory change involving the right mastoid region.  Impression: Evolving postop changes.

## 2024-09-24 NOTE — HISTORY OF PRESENT ILLNESS
[FreeTextEntry1] : 62 y/o female with PMHx of HTN, anxiety, bipolar/schizophrenia, tremors, glaucoma, incidentally found to have pituitary mass while admitted at [facility] [place] for psychosis Feb 2022. Serial imaging demonstrated progression of lesion, now s/p resection for pituitary lesion 4/22/24 (Frozen: pituitary tissue) with Dr. D'Amico.  In brief: - Feb 2022 Pt found to have pituitary mass during workup at [facility] [place] for psychosis - Following pituitary lesion dx, patient saw endocrinologist Dr. Caesar Thakkar. Endo labs notable for cortisol 23.3. - 11/13/23 repeated MRI brain which showed 2 x 2 x 2 cm right pituitary mass with erosion of the dorsal clivus and extension into the suprasellar cistern. Referred to neuro-opth, aravind, to repeat MRI @ 3 months. - 3/15/24 MRI SELLA showed increase of lesion  - 4/22/24 s/p transsphenoidal resection of pituitary lesion. PATH: pituitary neuroendocrine tumor   - 4/25/24 dc home, hospital course uncomplicated  5/8/24: Returns for post op follow- up.  She saw ENT Dr. Foreman last week. Endorses overall doing well. Denies headaches, dizziness, vision changes, nasal drainage.   Returns TODAY for follow up to review MRI. Endo labs done 8/28/24 WNL  ENT: Anthony Foreman Neurologist: Claudia Hightower Endocrinologist: Caesar Thakkar

## 2024-09-24 NOTE — DATA REVIEWED
[de-identified] : ACC: [id number]     EXAM:  MR BRAIN WAW IC   ORDERED BY: LYDIA GERMAIN  PROCEDURE DATE:  08/26/2024    INTERPRETATION:  Clinical indications: Status post surgery.  MRI of the sella turcica was performed using coronal and sagittal T1-weighted sequence. Coronal T2-weighted sequence was performed as well. Axial T2 FLAIR weighted sequence performed through the brain. The patient was injected with approximately 12 cc of gadavist IV with 3 cc contrast discarded. Coronal sagittal T1 weighted sequence was performed through the sella turcica. Axial T1 weighted sequence was performed through the brain.  This exam is compared with prior MRI sella turcica performed on April 24, 2024  Postoperative changes compatible transphenoidal surgery is again seen.  Previously noted area of decreased enhancement in the pituitary gland region has decreased in size with some residual area of enhancement seen centrally and to the right. This finding measures approximately 1.2 x 0.1 cm and previously measured approximately 1.6 x 1.0 cm. These findings are likely compatible with evolving postop changes though continued close interval follow-up can be done.  Pituitary stalk is centrally located and appears normal  Evaluation of surrounding sella structures including the optic chiasm and internal carotid arteries appear normal.  Elevation of brain parenchyma demonstrates parenchymal volume loss and chronic microvascular ischemic changes.  No abnormal areas of enhancement seen. Mucosal thickening is seen involving right ethmoid and bilateral sphenoid sinus. Inflammatory change involving the right mastoid region.  Impression: Evolving postop changes.

## 2024-09-25 ENCOUNTER — APPOINTMENT (OUTPATIENT)
Dept: NEUROSURGERY | Facility: CLINIC | Age: 64
End: 2024-09-25
Payer: COMMERCIAL

## 2024-09-26 ENCOUNTER — APPOINTMENT (OUTPATIENT)
Dept: NEUROSURGERY | Facility: CLINIC | Age: 64
End: 2024-09-26
Payer: COMMERCIAL

## 2024-09-26 DIAGNOSIS — E23.7 DISORDER OF PITUITARY GLAND, UNSPECIFIED: ICD-10-CM

## 2024-09-26 DIAGNOSIS — E89.3 POSTPROCEDURAL HYPOPITUITARISM: ICD-10-CM

## 2024-09-26 PROCEDURE — 99442: CPT

## 2024-09-30 NOTE — REASON FOR VISIT
[Home] : at home, [unfilled] , at the time of the visit. [Medical Office: ([facility])___] : at the medical office located in  [Verbal consent obtained from patient] : the patient, [unfilled] [FreeTextEntry1] : 5 month post op f/u, MRI review

## 2024-09-30 NOTE — HISTORY OF PRESENT ILLNESS
[FreeTextEntry1] : 62 y/o female with PMHx of HTN, anxiety, bipolar/schizophrenia, tremors, glaucoma, incidentally found to have pituitary mass while admitted at [facility] for psychosis Feb 2022. Serial imaging demonstrated progression of lesion, now s/p resection for pituitary lesion 4/22/24 (Frozen: pituitary tissue) with Dr. D'Amico.  In brief: - Feb 2022 Pt found to have pituitary mass during workup at [facility] for psychosis - Following pituitary lesion dx, patient saw endocrinologist Dr. Caesar Thakkar. Endo labs notable for cortisol 23.3. - 11/13/23 repeated MRI brain which showed 2 x 2 x 2 cm right pituitary mass with erosion of the dorsal clivus and extension into the suprasellar cistern. Referred to neuro-opth, aravind, to repeat MRI @ 3 months. - 3/15/24 MRI SELLA showed increase of lesion  - 4/22/24 s/p transsphenoidal resection of pituitary lesion. PATH: pituitary neuroendocrine tumor   - 4/25/24 dc home, hospital course uncomplicated  5/8/24: Returns for post op follow- up.  She saw ENT Dr. Foreman last week. Endorses overall doing well. Denies headaches, dizziness, vision changes, nasal drainage.   Returns TODAY for follow up to review MRI. Endo labs done 8/28/24 WNL  ENT: Anthony Foreman Neurologist: Claudia Hightower Endocrinologist: Caesar Thakkar

## 2024-09-30 NOTE — HISTORY OF PRESENT ILLNESS
[FreeTextEntry1] : 62 y/o female with PMHx of HTN, anxiety, bipolar/schizophrenia, tremors, glaucoma, incidentally found to have pituitary mass while admitted at [facility] for psychosis Feb 2022. Serial imaging demonstrated progression of lesion, now s/p resection for pituitary lesion 4/22/24 (Frozen: pituitary tissue) with Dr. D'Amico.  In brief: - Feb 2022 Pt found to have pituitary mass during workup at [facility] for psychosis - Following pituitary lesion dx, patient saw endocrinologist Dr. Caesar Thakkar. Endo labs notable for cortisol 23.3. - 11/13/23 repeated MRI brain which showed 2 x 2 x 2 cm right pituitary mass with erosion of the dorsal clivus and extension into the suprasellar cistern. Referred to neuro-opth, aravind, to repeat MRI @ 3 months. - 3/15/24 MRI SELLA showed increase of lesion  - 4/22/24 s/p transsphenoidal resection of pituitary lesion. PATH: pituitary neuroendocrine tumor   - 4/25/24 dc home, hospital course uncomplicated  5/8/24: Returns for post op follow- up.  She saw ENT Dr. Foreman last week. Endorses overall doing well. Denies headaches, dizziness, vision changes, nasal drainage.   Returns TODAY for follow up to review MRI. Endo labs done 8/28/24 WNL  ENT: Anthony Foreman Neurologist: Claudia Hightower Endocrinologist: Caesar Thakkar

## 2024-09-30 NOTE — DATA REVIEWED
[de-identified] : ACC: [id number]     EXAM:  MR BRAIN WAW IC   ORDERED BY: LYDIA GERMAIN  PROCEDURE DATE:  08/26/2024    INTERPRETATION:  Clinical indications: Status post surgery.  MRI of the sella turcica was performed using coronal and sagittal T1-weighted sequence. Coronal T2-weighted sequence was performed as well. Axial T2 FLAIR weighted sequence performed through the brain. The patient was injected with approximately 12 cc of gadavist IV with 3 cc contrast discarded. Coronal sagittal T1 weighted sequence was performed through the sella turcica. Axial T1 weighted sequence was performed through the brain.  This exam is compared with prior MRI sella turcica performed on April 24, 2024  Postoperative changes compatible transphenoidal surgery is again seen.  Previously noted area of decreased enhancement in the pituitary gland region has decreased in size with some residual area of enhancement seen centrally and to the right. This finding measures approximately 1.2 x 0.1 cm and previously measured approximately 1.6 x 1.0 cm. These findings are likely compatible with evolving postop changes though continued close interval follow-up can be done.  Pituitary stalk is centrally located and appears normal  Evaluation of surrounding sella structures including the optic chiasm and internal carotid arteries appear normal.  Elevation of brain parenchyma demonstrates parenchymal volume loss and chronic microvascular ischemic changes.  No abnormal areas of enhancement seen. Mucosal thickening is seen involving right ethmoid and bilateral sphenoid sinus. Inflammatory change involving the right mastoid region.  Impression: Evolving postop changes.

## 2024-09-30 NOTE — DATA REVIEWED
[de-identified] : ACC: [id number]     EXAM:  MR BRAIN WAW IC   ORDERED BY: LYDIA GERMAIN  PROCEDURE DATE:  08/26/2024    INTERPRETATION:  Clinical indications: Status post surgery.  MRI of the sella turcica was performed using coronal and sagittal T1-weighted sequence. Coronal T2-weighted sequence was performed as well. Axial T2 FLAIR weighted sequence performed through the brain. The patient was injected with approximately 12 cc of gadavist IV with 3 cc contrast discarded. Coronal sagittal T1 weighted sequence was performed through the sella turcica. Axial T1 weighted sequence was performed through the brain.  This exam is compared with prior MRI sella turcica performed on April 24, 2024  Postoperative changes compatible transphenoidal surgery is again seen.  Previously noted area of decreased enhancement in the pituitary gland region has decreased in size with some residual area of enhancement seen centrally and to the right. This finding measures approximately 1.2 x 0.1 cm and previously measured approximately 1.6 x 1.0 cm. These findings are likely compatible with evolving postop changes though continued close interval follow-up can be done.  Pituitary stalk is centrally located and appears normal  Evaluation of surrounding sella structures including the optic chiasm and internal carotid arteries appear normal.  Elevation of brain parenchyma demonstrates parenchymal volume loss and chronic microvascular ischemic changes.  No abnormal areas of enhancement seen. Mucosal thickening is seen involving right ethmoid and bilateral sphenoid sinus. Inflammatory change involving the right mastoid region.  Impression: Evolving postop changes.

## 2024-09-30 NOTE — REASON FOR VISIT
[Home] : at home, [unfilled] , at the time of the visit. [Medical Office: ([facility])___] : at the medical office located in  [Verbal consent obtained from patient] : the patient, [unfilled] [FreeTextEntry1] : 5 month post op f/u, MRI review

## 2024-09-30 NOTE — ASSESSMENT
[FreeTextEntry1] : 63F with asymptomatic incidental pituitary adenoma with progressive growth on serial imaging now s/p endoscopic endonasal resection about 2 weeks ago now. Post-operative recovery from endoscopic endonasal approach for resection of a pituitary adenoma appears successful, with full or near-full removal of the tumor, resulting in decompression of the optic nerves. Post-op Pituitary lab panel wnl. 3 month MRI without residual/recurrence. Doing well. Will re-image in 6 months.   Dr. D'Amico independently reviewed all available images with patient.   PLAN: - F/u with endo Dr. Quintanilla as scheduled  - Repeat MRI pituitary w/wo in 6 months (March 2025) - RTC after MRI for review - Continue f/u with PCP and psychiatrist in the interim   Patient verbalizes understanding of today's discussion and next steps in treatment plan.   A total of 15 minutes was spent reviewing the labs, imaging and physical examination of the patient. We discussed the diagnosis, and the plan. The patient's questions were answered. The patient demonstrated an excellent understanding of the plan.

## 2024-11-19 ENCOUNTER — RX RENEWAL (OUTPATIENT)
Age: 64
End: 2024-11-19

## 2025-02-12 ENCOUNTER — RX RENEWAL (OUTPATIENT)
Age: 65
End: 2025-02-12

## 2025-03-03 ENCOUNTER — APPOINTMENT (OUTPATIENT)
Dept: ENDOCRINOLOGY | Facility: CLINIC | Age: 65
End: 2025-03-03

## 2025-03-03 DIAGNOSIS — E66.01 MORBID (SEVERE) OBESITY DUE TO EXCESS CALORIES: ICD-10-CM

## 2025-03-03 DIAGNOSIS — E89.3 POSTPROCEDURAL HYPOPITUITARISM: ICD-10-CM

## 2025-06-24 ENCOUNTER — APPOINTMENT (OUTPATIENT)
Dept: MRI IMAGING | Facility: HOSPITAL | Age: 65
End: 2025-06-24

## 2025-06-24 ENCOUNTER — OUTPATIENT (OUTPATIENT)
Dept: OUTPATIENT SERVICES | Facility: HOSPITAL | Age: 65
LOS: 1 days | End: 2025-06-24
Payer: COMMERCIAL

## 2025-06-24 DIAGNOSIS — Z98.891 HISTORY OF UTERINE SCAR FROM PREVIOUS SURGERY: Chronic | ICD-10-CM

## 2025-06-24 PROCEDURE — 70553 MRI BRAIN STEM W/O & W/DYE: CPT | Mod: 26

## 2025-06-24 PROCEDURE — A9585: CPT

## 2025-06-24 PROCEDURE — 70553 MRI BRAIN STEM W/O & W/DYE: CPT

## 2025-07-09 ENCOUNTER — APPOINTMENT (OUTPATIENT)
Dept: NEUROSURGERY | Facility: CLINIC | Age: 65
End: 2025-07-09

## 2025-07-09 ENCOUNTER — NON-APPOINTMENT (OUTPATIENT)
Age: 65
End: 2025-07-09

## 2025-07-09 PROCEDURE — 99213 OFFICE O/P EST LOW 20 MIN: CPT | Mod: 93

## 2025-07-17 ENCOUNTER — RX RENEWAL (OUTPATIENT)
Age: 65
End: 2025-07-17

## 2025-08-15 ENCOUNTER — RX RENEWAL (OUTPATIENT)
Age: 65
End: 2025-08-15

## (undated) DEVICE — POSITIONER FOAM HEAD DONUT 9" (PINK)

## (undated) DEVICE — DOPPLER PROBE 280MM 20MHZ BAYONETED STERILE

## (undated) DEVICE — ELCTR BOVIE SUCTION 8FR 6"

## (undated) DEVICE — Device

## (undated) DEVICE — FOLEY TRAY 16FR 5CC LF UMETER CLOSED

## (undated) DEVICE — SPECIMEN CONTAINER 100ML

## (undated) DEVICE — SUT MONOCRYL 4-0 27" PS-2 UNDYED

## (undated) DEVICE — SUT VICRYL 3-0 27" SH UNDYED

## (undated) DEVICE — BUR MEDTRONIC ENT TAPERED DIAMOND CHOANAL ATRESIA 15 DEGREE 4MM X 13CM

## (undated) DEVICE — DRSG STERISTRIPS 0.5 X 4"

## (undated) DEVICE — DRSG MEROCEL STANDARD W STRING 8CM

## (undated) DEVICE — MIDAS REX MR7 LUBRICANT DIFFUSER CARTRIDGE

## (undated) DEVICE — SYR LUER LOK 50CC

## (undated) DEVICE — BLADE MEDTRONIC ENT QUADCUT ROTATABLE STRAIGHT 4MM X 13CM

## (undated) DEVICE — CRANIAL MASK TRACKER

## (undated) DEVICE — DRAPE MAGNETIC INSTRUMENT MEDIUM

## (undated) DEVICE — PACK UPPER BODY

## (undated) DEVICE — STAPLER COVIDIEN SKIN APPOSE 35

## (undated) DEVICE — GLV 8 PROTEXIS (WHITE)

## (undated) DEVICE — ELCTR HOLSTER ACCESSORY

## (undated) DEVICE — DRSG MASTISOL

## (undated) DEVICE — SUT PLAIN GUT 4-0 18" SC-1

## (undated) DEVICE — PREP BETADINE KIT

## (undated) DEVICE — MARKING PEN W RULER

## (undated) DEVICE — SOL ANTI FOG

## (undated) DEVICE — DRAPE TOWEL BLUE 17" X 24"

## (undated) DEVICE — ENDO SCRUB

## (undated) DEVICE — ELCTR STRYKER NEPTUNE SMOKE EVACUATION PENCIL (GREEN)

## (undated) DEVICE — STORZ DURA MICRO KNIFE INSERT SICKLE-SHAPED

## (undated) DEVICE — STRYKER INSTRUMENT BATTERY

## (undated) DEVICE — DRAPE TOWEL BLUE STICKY

## (undated) DEVICE — TUBING STRYKER SET AND EXTENDER FILTER TUBING

## (undated) DEVICE — MINI DOPPLER PROBE

## (undated) DEVICE — CLEANING SHEATH ENDO-SCRUB FOR STORZ 7210AA TELESCOPE 4MM 0 DEGREE

## (undated) DEVICE — TUBING SUCTION 20FT

## (undated) DEVICE — GOWN XL

## (undated) DEVICE — DRAPE INSTRUMENT POUCH 6.75" X 11"

## (undated) DEVICE — SUCTION CATH AIRLIFE CONTROL VALVE TRIFLO 14FR

## (undated) DEVICE — NDL ELECTRODE ULTRACLEAN 6

## (undated) DEVICE — GOWN ROYAL SILK XL

## (undated) DEVICE — TIP UNIV SUPERLONG STR

## (undated) DEVICE — STAPLER SKIN PROXIMATE

## (undated) DEVICE — DRAPE SURGICAL #1010

## (undated) DEVICE — SYR ASEPTO

## (undated) DEVICE — SUT PROLENE 2-0 30" CT-2

## (undated) DEVICE — DRSG TEGADERM 1.75X1.75"

## (undated) DEVICE — GLV 8.5 PROTEXIS (WHITE)

## (undated) DEVICE — DRAPE LIGHT HANDLE COVER (GREEN)

## (undated) DEVICE — GLV 7 PROTEXIS (WHITE)